# Patient Record
Sex: FEMALE | Race: WHITE | NOT HISPANIC OR LATINO | Employment: OTHER | URBAN - NONMETROPOLITAN AREA
[De-identification: names, ages, dates, MRNs, and addresses within clinical notes are randomized per-mention and may not be internally consistent; named-entity substitution may affect disease eponyms.]

---

## 2019-08-19 ENCOUNTER — HISTORICAL (OUTPATIENT)
Dept: ADMINISTRATIVE | Facility: HOSPITAL | Age: 54
End: 2019-08-19

## 2021-06-11 ENCOUNTER — HISTORICAL (OUTPATIENT)
Dept: ADMINISTRATIVE | Facility: HOSPITAL | Age: 56
End: 2021-06-11

## 2021-06-14 ENCOUNTER — HISTORICAL (OUTPATIENT)
Dept: ADMINISTRATIVE | Facility: HOSPITAL | Age: 56
End: 2021-06-14

## 2022-02-23 LAB
LEFT EYE DM RETINOPATHY: NEGATIVE
RIGHT EYE DM RETINOPATHY: NEGATIVE

## 2022-03-04 ENCOUNTER — HISTORICAL (OUTPATIENT)
Dept: ADMINISTRATIVE | Facility: HOSPITAL | Age: 57
End: 2022-03-04

## 2022-04-07 ENCOUNTER — HISTORICAL (OUTPATIENT)
Dept: ADMINISTRATIVE | Facility: HOSPITAL | Age: 57
End: 2022-04-07

## 2022-04-24 VITALS
BODY MASS INDEX: 48.82 KG/M2 | HEIGHT: 65 IN | WEIGHT: 293 LBS | SYSTOLIC BLOOD PRESSURE: 128 MMHG | OXYGEN SATURATION: 95 % | DIASTOLIC BLOOD PRESSURE: 74 MMHG

## 2022-12-07 LAB — HBA1C MFR BLD: 5.5 % (ref 4–6)

## 2023-01-09 VITALS
WEIGHT: 293 LBS | HEIGHT: 65 IN | BODY MASS INDEX: 48.82 KG/M2 | SYSTOLIC BLOOD PRESSURE: 110 MMHG | TEMPERATURE: 97 F | OXYGEN SATURATION: 95 % | DIASTOLIC BLOOD PRESSURE: 60 MMHG | HEART RATE: 72 BPM

## 2023-01-09 PROBLEM — I10 BENIGN ESSENTIAL HYPERTENSION: Status: ACTIVE | Noted: 2023-01-09

## 2023-01-09 PROBLEM — G62.9 NEUROPATHY: Status: ACTIVE | Noted: 2023-01-09

## 2023-01-09 PROBLEM — G60.0 HEREDITARY SENSORIMOTOR NEUROPATHY: Status: ACTIVE | Noted: 2023-01-09

## 2023-01-09 PROBLEM — G47.419 NARCOLEPSY WITHOUT CATAPLEXY: Status: ACTIVE | Noted: 2023-01-09

## 2023-01-09 PROBLEM — E11.9 DIABETES MELLITUS: Status: ACTIVE | Noted: 2023-01-09

## 2023-01-09 PROBLEM — I20.9 ANGINA PECTORIS: Status: ACTIVE | Noted: 2023-01-09

## 2023-01-09 PROBLEM — E78.5 HYPERLIPIDEMIA: Status: ACTIVE | Noted: 2023-01-09

## 2023-01-09 PROBLEM — I25.10 ARTERIOSCLEROSIS OF CORONARY ARTERY: Status: ACTIVE | Noted: 2023-01-09

## 2023-01-09 PROBLEM — E66.01 MORBID OBESITY: Status: ACTIVE | Noted: 2023-01-09

## 2023-01-09 PROBLEM — F32.9 MAJOR DEPRESSIVE DISORDER: Status: ACTIVE | Noted: 2023-01-09

## 2023-01-09 PROBLEM — G47.30 SLEEP APNEA: Status: ACTIVE | Noted: 2023-01-09

## 2023-01-09 PROBLEM — N18.30 STAGE 3 CHRONIC KIDNEY DISEASE: Status: ACTIVE | Noted: 2023-01-09

## 2023-01-09 PROBLEM — J44.9 CHRONIC OBSTRUCTIVE PULMONARY DISEASE: Status: ACTIVE | Noted: 2023-01-09

## 2023-01-09 PROBLEM — I50.30 HEART FAILURE WITH PRESERVED EJECTION FRACTION: Status: ACTIVE | Noted: 2023-01-09

## 2023-01-09 RX ORDER — ASPIRIN 81 MG/1
81 TABLET ORAL DAILY
COMMUNITY

## 2023-01-09 RX ORDER — MECLIZINE HYDROCHLORIDE 25 MG/1
25 TABLET ORAL 3 TIMES DAILY PRN
COMMUNITY

## 2023-01-09 RX ORDER — PANTOPRAZOLE SODIUM 40 MG/1
TABLET, DELAYED RELEASE ORAL
COMMUNITY
Start: 2022-04-27 | End: 2023-05-31 | Stop reason: SDUPTHER

## 2023-01-09 RX ORDER — ROSUVASTATIN CALCIUM 10 MG/1
TABLET, COATED ORAL
COMMUNITY
Start: 2022-02-25 | End: 2023-02-27 | Stop reason: SDUPTHER

## 2023-01-09 RX ORDER — AMITRIPTYLINE HYDROCHLORIDE 100 MG/1
TABLET ORAL
COMMUNITY
Start: 2021-12-28 | End: 2023-02-27 | Stop reason: SDUPTHER

## 2023-01-09 RX ORDER — FAMOTIDINE 20 MG/1
TABLET, FILM COATED ORAL
COMMUNITY
Start: 2021-12-28 | End: 2023-02-27 | Stop reason: SDUPTHER

## 2023-01-09 RX ORDER — CLONAZEPAM 1 MG/1
TABLET ORAL
COMMUNITY
Start: 2022-02-22 | End: 2023-03-29 | Stop reason: SDUPTHER

## 2023-01-09 RX ORDER — ALBUTEROL SULFATE 90 UG/1
2 AEROSOL, METERED RESPIRATORY (INHALATION) 4 TIMES DAILY
COMMUNITY
Start: 2022-08-29 | End: 2023-02-27 | Stop reason: SDUPTHER

## 2023-01-09 RX ORDER — GABAPENTIN 600 MG/1
TABLET ORAL
COMMUNITY
Start: 2021-12-28 | End: 2024-01-22

## 2023-01-09 RX ORDER — HYDROCODONE BITARTRATE AND ACETAMINOPHEN 10; 325 MG/1; MG/1
TABLET ORAL
COMMUNITY
Start: 2021-12-28 | End: 2023-02-03 | Stop reason: SDUPTHER

## 2023-01-09 RX ORDER — DULOXETIN HYDROCHLORIDE 60 MG/1
CAPSULE, DELAYED RELEASE ORAL
COMMUNITY
Start: 2021-12-28 | End: 2023-03-29 | Stop reason: SDUPTHER

## 2023-01-09 RX ORDER — POTASSIUM CHLORIDE 20 MEQ/1
1 TABLET, EXTENDED RELEASE ORAL EVERY MORNING
COMMUNITY
Start: 2022-09-21 | End: 2023-02-27 | Stop reason: SDUPTHER

## 2023-01-09 RX ORDER — TORSEMIDE 20 MG/1
40 TABLET ORAL 2 TIMES DAILY
COMMUNITY
Start: 2022-01-25 | End: 2023-05-31 | Stop reason: SDUPTHER

## 2023-01-09 RX ORDER — ALBUTEROL SULFATE 0.83 MG/ML
SOLUTION RESPIRATORY (INHALATION)
COMMUNITY
Start: 2022-08-22

## 2023-01-09 RX ORDER — LOSARTAN POTASSIUM 25 MG/1
TABLET ORAL
COMMUNITY
Start: 2022-07-28 | End: 2023-07-25

## 2023-01-09 RX ORDER — FLUTICASONE PROPIONATE 50 MCG
SPRAY, SUSPENSION (ML) NASAL
COMMUNITY
Start: 2022-10-06 | End: 2023-08-04

## 2023-01-09 RX ORDER — CARVEDILOL 6.25 MG/1
TABLET ORAL
COMMUNITY
Start: 2022-01-25 | End: 2023-08-31 | Stop reason: SDUPTHER

## 2023-01-09 RX ORDER — CYANOCOBALAMIN 1000 UG/ML
INJECTION, SOLUTION INTRAMUSCULAR; SUBCUTANEOUS
COMMUNITY
Start: 2022-02-22 | End: 2023-02-27 | Stop reason: SDUPTHER

## 2023-01-09 RX ORDER — ISOSORBIDE MONONITRATE 30 MG/1
1 TABLET, EXTENDED RELEASE ORAL 2 TIMES DAILY
COMMUNITY
Start: 2022-09-21 | End: 2023-02-27 | Stop reason: SDUPTHER

## 2023-01-09 RX ORDER — NITROGLYCERIN 0.4 MG/1
0.4 TABLET SUBLINGUAL EVERY 5 MIN PRN
COMMUNITY

## 2023-01-09 RX ORDER — METFORMIN HYDROCHLORIDE 1000 MG/1
TABLET ORAL
COMMUNITY
Start: 2021-12-28 | End: 2023-08-10 | Stop reason: SDUPTHER

## 2023-01-24 ENCOUNTER — TELEPHONE (OUTPATIENT)
Dept: FAMILY MEDICINE | Facility: CLINIC | Age: 58
End: 2023-01-24

## 2023-01-24 NOTE — TELEPHONE ENCOUNTER
----- Message from Kourtney Paris sent at 1/24/2023  8:51 AM CST -----  Regarding: refill  Patient is requesting a refill on     Trulicity Pen 0.75 mg/0.5 mL subcutaneous solution    [facility]

## 2023-01-26 ENCOUNTER — HOSPITAL ENCOUNTER (EMERGENCY)
Facility: HOSPITAL | Age: 58
Discharge: HOME | End: 2023-01-26
Attending: FAMILY MEDICINE
Payer: MEDICARE

## 2023-01-26 VITALS
BODY MASS INDEX: 44.41 KG/M2 | HEART RATE: 81 BPM | WEIGHT: 293 LBS | TEMPERATURE: 98 F | HEIGHT: 68 IN | OXYGEN SATURATION: 98 % | SYSTOLIC BLOOD PRESSURE: 131 MMHG | RESPIRATION RATE: 20 BRPM | DIASTOLIC BLOOD PRESSURE: 78 MMHG

## 2023-01-26 DIAGNOSIS — R05.9 COUGH: ICD-10-CM

## 2023-01-26 DIAGNOSIS — T17.908A ASPIRATION INTO AIRWAY, INITIAL ENCOUNTER: Primary | ICD-10-CM

## 2023-01-26 DIAGNOSIS — R06.02 SOB (SHORTNESS OF BREATH): ICD-10-CM

## 2023-01-26 DIAGNOSIS — T17.308A CHOKING: ICD-10-CM

## 2023-01-26 LAB
ABS NEUT CALC (OHS): 7.3 X10(3)/MCL (ref 2.1–9.2)
ALBUMIN SERPL-MCNC: 5 G/DL (ref 3.4–5)
ALBUMIN/GLOB SERPL: 1.7 RATIO
ALP SERPL-CCNC: 89 UNIT/L (ref 50–144)
ALT SERPL-CCNC: 79 UNIT/L (ref 1–45)
AST SERPL-CCNC: 53 UNIT/L (ref 14–36)
BASOPHILS NFR BLD MANUAL: 0.13 X10(3)/MCL (ref 0–0.2)
BASOPHILS NFR BLD MANUAL: 1 % (ref 0–2)
BILIRUBIN DIRECT+TOT PNL SERPL-MCNC: 0.6 MG/DL (ref 0–1)
BNP BLD-MCNC: 49.8 PG/ML (ref 10–450)
BUN SERPL-MCNC: 23 MG/DL (ref 7–20)
CALCIUM SERPL-MCNC: 11.1 MG/DL (ref 8.4–10.2)
CHLORIDE SERPL-SCNC: 97 MMOL/L (ref 98–110)
CK MB SERPL-MCNC: 2.03 NG/ML (ref 0–3.38)
CK SERPL-CCNC: 95 U/L (ref 30–135)
CO2 SERPL-SCNC: 27 MMOL/L (ref 21–32)
CREAT SERPL-MCNC: 0.8 MG/DL (ref 0.66–1.25)
EOSINOPHIL NFR BLD MANUAL: 0.13 X10(3)/MCL (ref 0–0.9)
EOSINOPHIL NFR BLD MANUAL: 1 % (ref 0–8)
ERYTHROCYTE [DISTWIDTH] IN BLOOD BY AUTOMATED COUNT: 14.4 % (ref 11–14.5)
GFR SERPLBLD CREATININE-BSD FMLA CKD-EPI: 86 MLS/MIN/1.73/M2
GLOBULIN SER-MCNC: 2.9 GM/DL (ref 2–3.9)
GLUCOSE SERPL-MCNC: 96 MG/DL (ref 70–115)
HCT VFR BLD AUTO: 47.5 % (ref 36–48)
HGB BLD-MCNC: 15.6 GM/DL (ref 11.8–16)
IMM GRANULOCYTES # BLD AUTO: 0.03 X10(3)/MCL (ref 0–0.03)
IMM GRANULOCYTES NFR BLD AUTO: 0.2 % (ref 0–0.5)
LACTATE SERPL-SCNC: 2.2 MMOL/L (ref 0.4–2)
LACTATE SERPL-SCNC: 2.7 MMOL/L (ref 0.4–2)
LYMPHOCYTES NFR BLD MANUAL: 35 % (ref 13–40)
LYMPHOCYTES NFR BLD MANUAL: 4.48 X10(3)/MCL
MCH RBC QN AUTO: 28.2 PG (ref 27–34)
MCV RBC AUTO: 85.7 FL (ref 79–99)
MEAN CELL HEMOGLOBIN CONCENTRATION (OHS) G/DL: 32.8 G/DL (ref 31–37)
MONOCYTES NFR BLD MANUAL: 0.77 X10(3)/MCL (ref 0.1–1.3)
MONOCYTES NFR BLD MANUAL: 6 % (ref 2–11)
NEUTROPHILS NFR BLD MANUAL: 56 % (ref 47–80)
NEUTS BAND NFR BLD MANUAL: 1 % (ref 0–11)
NRBC BLD AUTO-RTO: 0 % (ref 0–1)
PLATELET # BLD AUTO: 274 X10(3)/MCL (ref 140–371)
PLATELET # BLD EST: ADEQUATE 10*3/UL
PMV BLD AUTO: 9.3 FL (ref 9.4–12.4)
POTASSIUM SERPL-SCNC: 4.7 MMOL/L (ref 3.5–5.1)
PROT SERPL-MCNC: 7.9 GM/DL (ref 6.3–8.2)
RBC # BLD AUTO: 5.54 X10(6)/MCL (ref 4–5.1)
RBC MORPH BLD: NORMAL
SODIUM SERPL-SCNC: 138 MMOL/L (ref 135–145)
TROPONIN I SERPL-MCNC: <0.012 NG/ML (ref 0–0.03)
WBC # SPEC AUTO: 12.8 X10(3)/MCL (ref 4–11.5)

## 2023-01-26 PROCEDURE — 87040 BLOOD CULTURE FOR BACTERIA: CPT | Mod: 59 | Performed by: FAMILY MEDICINE

## 2023-01-26 PROCEDURE — 99284 EMERGENCY DEPT VISIT MOD MDM: CPT | Mod: 25

## 2023-01-26 PROCEDURE — 96365 THER/PROPH/DIAG IV INF INIT: CPT

## 2023-01-26 PROCEDURE — 96366 THER/PROPH/DIAG IV INF ADDON: CPT

## 2023-01-26 PROCEDURE — 96375 TX/PRO/DX INJ NEW DRUG ADDON: CPT

## 2023-01-26 PROCEDURE — 82553 CREATINE MB FRACTION: CPT | Performed by: FAMILY MEDICINE

## 2023-01-26 PROCEDURE — 85025 COMPLETE CBC W/AUTO DIFF WBC: CPT | Performed by: FAMILY MEDICINE

## 2023-01-26 PROCEDURE — 36415 COLL VENOUS BLD VENIPUNCTURE: CPT | Performed by: FAMILY MEDICINE

## 2023-01-26 PROCEDURE — 82550 ASSAY OF CK (CPK): CPT | Performed by: FAMILY MEDICINE

## 2023-01-26 PROCEDURE — 63600175 PHARM REV CODE 636 W HCPCS: Performed by: FAMILY MEDICINE

## 2023-01-26 PROCEDURE — 80053 COMPREHEN METABOLIC PANEL: CPT | Performed by: FAMILY MEDICINE

## 2023-01-26 PROCEDURE — 25000003 PHARM REV CODE 250: Performed by: FAMILY MEDICINE

## 2023-01-26 PROCEDURE — 85027 COMPLETE CBC AUTOMATED: CPT | Performed by: FAMILY MEDICINE

## 2023-01-26 PROCEDURE — 84484 ASSAY OF TROPONIN QUANT: CPT | Performed by: FAMILY MEDICINE

## 2023-01-26 PROCEDURE — 83605 ASSAY OF LACTIC ACID: CPT | Mod: 91 | Performed by: FAMILY MEDICINE

## 2023-01-26 PROCEDURE — 83880 ASSAY OF NATRIURETIC PEPTIDE: CPT | Performed by: FAMILY MEDICINE

## 2023-01-26 RX ORDER — DEXAMETHASONE SODIUM PHOSPHATE 4 MG/ML
8 INJECTION, SOLUTION INTRA-ARTICULAR; INTRALESIONAL; INTRAMUSCULAR; INTRAVENOUS; SOFT TISSUE
Status: COMPLETED | OUTPATIENT
Start: 2023-01-26 | End: 2023-01-26

## 2023-01-26 RX ORDER — LEVOFLOXACIN 500 MG/1
500 TABLET, FILM COATED ORAL DAILY
Qty: 7 TABLET | Refills: 0 | Status: SHIPPED | OUTPATIENT
Start: 2023-01-27 | End: 2023-02-03

## 2023-01-26 RX ORDER — LEVOFLOXACIN 5 MG/ML
750 INJECTION, SOLUTION INTRAVENOUS
Status: COMPLETED | OUTPATIENT
Start: 2023-01-26 | End: 2023-01-26

## 2023-01-26 RX ADMIN — LEVOFLOXACIN 750 MG: 5 INJECTION, SOLUTION INTRAVENOUS at 03:01

## 2023-01-26 RX ADMIN — SODIUM CHLORIDE 1000 ML: 9 INJECTION, SOLUTION INTRAVENOUS at 04:01

## 2023-01-26 RX ADMIN — DEXAMETHASONE SODIUM PHOSPHATE 8 MG: 4 INJECTION, SOLUTION INTRA-ARTICULAR; INTRALESIONAL; INTRAMUSCULAR; INTRAVENOUS; SOFT TISSUE at 02:01

## 2023-01-26 NOTE — ED PROVIDER NOTES
"Encounter Date: 1/26/2023       History     Chief Complaint   Patient presents with    Choking     Choked while eating vegi soup. Pt has COPD, uses home o2.      Patient was in her usual health including 24 hour dependence on oxygen for end-stage COPD, on the way out to see her primary physician she grabbed a bite of vegetable soup.  It "went down the wrong way", and the patient immediately started coughing and having trouble breathing.      Review of patient's allergies indicates:  No Known Allergies  Past Medical History:   Diagnosis Date    Angina pectoris     CMT (Charcot-Jasmyn-Tooth disease)     COPD (chronic obstructive pulmonary disease)     Coronary artery disease     Diabetes mellitus, type 2     Heart failure with preserved ejection fraction     Hypertension     Major depressive disorder     Morbid obesity     Narcolepsy without cataplexy     Neuropathy     Sleep apnea, unspecified      Past Surgical History:   Procedure Laterality Date    CHOLECYSTECTOMY      EXTRACTION OF CATARACT Right 05/23/2022    EXTRACTION OF CATARACT Left 05/04/2022    HYSTERECTOMY      TONSILLECTOMY       Family History   Problem Relation Age of Onset    Breast cancer Mother     Stroke Mother     Heart attack Father     Leukemia Father     Breast cancer Sister     Hypertension Brother      Social History     Tobacco Use    Smoking status: Every Day     Types: Vaping with nicotine    Smokeless tobacco: Never   Substance Use Topics    Drug use: Never     Review of Systems   Constitutional:  Negative for fever.   HENT:  Positive for ear pain. Negative for sore throat.    Respiratory:  Positive for cough, choking, chest tightness, shortness of breath, wheezing and stridor.    Cardiovascular:  Negative for chest pain and leg swelling.   Gastrointestinal:  Negative for nausea.   Genitourinary:  Negative for dysuria.   Musculoskeletal:  Negative for back pain.   Skin:  Negative for rash.   Neurological:  Negative for weakness. "   Hematological:  Does not bruise/bleed easily.     Physical Exam     Initial Vitals [01/26/23 1353]   BP Pulse Resp Temp SpO2   (!) 154/80 90 (!) 32 98 °F (36.7 °C) 98 %      MAP       --         Physical Exam    Constitutional:   Morbidly obese in poor health. Alert and oriented x4. Dry coughing.    HENT:   Bilateral ears with cerumen impaction.   Cardiovascular:  Normal rate and regular rhythm.           Pulmonary/Chest:   Occas bilateral rhonchi, no active wheezing   Abdominal: Abdomen is soft. There is no abdominal tenderness.   Morbidly obese     Neurological: She is alert and oriented to person, place, and time. She has normal reflexes.       ED Course   Procedures  Labs Reviewed   COMPREHENSIVE METABOLIC PANEL - Abnormal; Notable for the following components:       Result Value    Chloride 97 (*)     Blood Urea Nitrogen 23.0 (*)     Calcium Level Total 11.1 (*)     Alanine Aminotransferase 79 (*)     Aspartate Aminotransferase 53 (*)     All other components within normal limits   LACTIC ACID, PLASMA - Abnormal; Notable for the following components:    Lactic Acid Level 2.7 (*)     All other components within normal limits   CBC WITH DIFFERENTIAL - Abnormal; Notable for the following components:    WBC 12.8 (*)     RBC 5.54 (*)     MPV 9.3 (*)     All other components within normal limits   TROPONIN I - Normal   CK - Normal   CK-MB - Normal   NT-PRO NATRIURETIC PEPTIDE - Normal   BLOOD CULTURE OLG   BLOOD CULTURE OLG   CBC W/ AUTO DIFFERENTIAL    Narrative:     The following orders were created for panel order CBC Auto Differential.  Procedure                               Abnormality         Status                     ---------                               -----------         ------                     CBC with Differential[[id number]]        Abnormal            Final result               Manual Differential[[id number]]                              In process                   Please view results for these  tests on the individual orders.   MANUAL DIFFERENTIAL   LACTIC ACID, PLASMA          Imaging Results              X-Ray Chest AP Portable (Final result)  Result time 01/26/23 15:06:36      Final result by Grey Riley III, MD (01/26/23 15:06:36)                   Impression:      1. The heart is mildly to moderately enlarged with vascular congestion and slightly increased interstitial lung markings.      Electronically signed by: Grey Riley  Date:    01/26/2023  Time:    15:06               Narrative:    EXAMINATION:  STUDY: XR CHEST AP PORTABLE    CLINICAL HISTORY AND TECHNIQUE:  RT [facility] on 1/26/2023  2:41 PM    CURRENT CLINICAL HISTORY: ER PT    PT CHOKED WHILE EATING SOUP. SOB    PMH: CV-, SMOKER , COPD, CAD, DIABETES, HTN, ANGINA PECTORIS, HEART FAILURE    TECHNIQUE: PORTABLE CHEST    TECHNOLOGIST: MARJAN    COMPARISON:  06/11/2021    FINDINGS:  The heart is mildly to moderately enlarged with vascular congestion and slightly increased interstitial lung markings.I see no lobar or segmental infiltrates.No significant pleural effusions are noted.No significant musculoskeletal or vascular abnormalities are appreciated.                                       Medications   levoFLOXacin 750 mg/150 mL IVPB 750 mg (750 mg Intravenous New Bag 1/26/23 1515)   sodium chloride 0.9% bolus 1,000 mL 1,000 mL (1,000 mLs Intravenous New Bag 1/26/23 1604)   dexAMETHasone injection 8 mg (8 mg Intravenous Given 1/26/23 1458)                              Clinical Impression:   Final diagnoses:  [R06.02] SOB (shortness of breath)  [T17.308A] Choking  [R05.9] Cough  [T17.908A] Aspiration into airway, initial encounter (Primary)        ED Disposition Condition    Discharge Stable          ED Prescriptions       Medication Sig Dispense Start Date End Date Auth. Provider    levoFLOXacin (LEVAQUIN) 500 MG tablet Take 1 tablet (500 mg total) by mouth once daily. for 7 days 7 tablet 1/27/2023 2/3/2023 Everett Blanco MD          Follow-up  Information       Follow up With Specialties Details Why Contact Info    Kahlil Rodriguez MD Family Medicine Schedule an appointment as soon as possible for a visit  If symptoms worsen [address]  [facility]  [place] [address]  [phone]               Everett Blanco MD  01/26/23 3470

## 2023-01-26 NOTE — DISCHARGE INSTRUCTIONS
No obvious pneumonia seen on xray, but with probably aspiration of food into lungs, will start antibiotics. Close follow up with Dr. Rodriguez recommended. Consider return with shortness of breath, high fevers, etc.

## 2023-01-31 ENCOUNTER — PATIENT OUTREACH (OUTPATIENT)
Dept: ADMINISTRATIVE | Facility: HOSPITAL | Age: 58
End: 2023-01-31
Payer: MEDICARE

## 2023-01-31 NOTE — PROGRESS NOTES
Population Health Outreach.Records Received, hyper-linked into chart at this time. The following record(s)  below were uploaded for Health Maintenance .    12/7/2022-HEMOGLOBIN A1C

## 2023-02-01 ENCOUNTER — TELEPHONE (OUTPATIENT)
Dept: FAMILY MEDICINE | Facility: CLINIC | Age: 58
End: 2023-02-01
Payer: MEDICARE

## 2023-02-01 DIAGNOSIS — E11.9 INSULIN DEPENDENT TYPE 2 DIABETES MELLITUS: Primary | ICD-10-CM

## 2023-02-01 DIAGNOSIS — Z79.4 INSULIN DEPENDENT TYPE 2 DIABETES MELLITUS: Primary | ICD-10-CM

## 2023-02-01 LAB
BACTERIA BLD CULT: NORMAL
BACTERIA BLD CULT: NORMAL

## 2023-02-01 NOTE — TELEPHONE ENCOUNTER
Telephone call from Zainab with [facility] saying pt has 7 lb weight gain with no changes to diet. Pt hasn't had her Trulicity x 2 weeks because she missed her last appt due to was in hospital for aspiration. Has appt on the 10th scheduled.      [phone] [facility]

## 2023-02-02 RX ORDER — DULAGLUTIDE 0.75 MG/.5ML
0.75 INJECTION, SOLUTION SUBCUTANEOUS WEEKLY
Qty: 4 PEN | Refills: 0 | Status: SHIPPED | OUTPATIENT
Start: 2023-02-02 | End: 2023-02-07

## 2023-02-02 RX ORDER — DULAGLUTIDE 0.75 MG/.5ML
0.75 INJECTION, SOLUTION SUBCUTANEOUS WEEKLY
COMMUNITY
Start: 2022-12-29 | End: 2023-02-02 | Stop reason: SDUPTHER

## 2023-02-03 DIAGNOSIS — G60.0 CHARCOT-MARIE-TOOTH DISEASE: Primary | ICD-10-CM

## 2023-02-03 RX ORDER — HYDROCODONE BITARTRATE AND ACETAMINOPHEN 10; 325 MG/1; MG/1
1 TABLET ORAL EVERY 6 HOURS PRN
Qty: 120 TABLET | Refills: 0 | Status: SHIPPED | OUTPATIENT
Start: 2023-02-03 | End: 2023-03-03

## 2023-02-03 NOTE — TELEPHONE ENCOUNTER
----- Message from Zuhair Zaidi MA sent at 2/3/2023 11:15 AM CST -----  Regarding: Refill  Norco 10mg, QID PRN       [facility]       [phone]

## 2023-02-07 ENCOUNTER — OFFICE VISIT (OUTPATIENT)
Dept: FAMILY MEDICINE | Facility: CLINIC | Age: 58
End: 2023-02-07
Payer: MEDICARE

## 2023-02-07 VITALS
WEIGHT: 293 LBS | BODY MASS INDEX: 44.41 KG/M2 | DIASTOLIC BLOOD PRESSURE: 60 MMHG | HEART RATE: 70 BPM | SYSTOLIC BLOOD PRESSURE: 118 MMHG | OXYGEN SATURATION: 97 % | TEMPERATURE: 97 F | HEIGHT: 68 IN

## 2023-02-07 DIAGNOSIS — I50.32 CHRONIC HEART FAILURE WITH PRESERVED EJECTION FRACTION: ICD-10-CM

## 2023-02-07 DIAGNOSIS — G89.4 CHRONIC PAIN SYNDROME: ICD-10-CM

## 2023-02-07 DIAGNOSIS — E08.22 DIABETES MELLITUS DUE TO UNDERLYING CONDITION WITH STAGE 3 CHRONIC KIDNEY DISEASE, WITHOUT LONG-TERM CURRENT USE OF INSULIN, UNSPECIFIED WHETHER STAGE 3A OR 3B CKD: ICD-10-CM

## 2023-02-07 DIAGNOSIS — F32.9 MAJOR DEPRESSIVE DISORDER WITH CURRENT ACTIVE EPISODE, UNSPECIFIED DEPRESSION EPISODE SEVERITY, UNSPECIFIED WHETHER RECURRENT: ICD-10-CM

## 2023-02-07 DIAGNOSIS — Z79.4 INSULIN DEPENDENT TYPE 2 DIABETES MELLITUS: ICD-10-CM

## 2023-02-07 DIAGNOSIS — E78.2 MIXED HYPERLIPIDEMIA: ICD-10-CM

## 2023-02-07 DIAGNOSIS — I25.119 CORONARY ARTERY DISEASE INVOLVING NATIVE CORONARY ARTERY OF NATIVE HEART WITH ANGINA PECTORIS: ICD-10-CM

## 2023-02-07 DIAGNOSIS — J43.9 PULMONARY EMPHYSEMA, UNSPECIFIED EMPHYSEMA TYPE: ICD-10-CM

## 2023-02-07 DIAGNOSIS — G60.0 HEREDITARY SENSORIMOTOR NEUROPATHY: Primary | ICD-10-CM

## 2023-02-07 DIAGNOSIS — H60.90 OTITIS EXTERNA, UNSPECIFIED CHRONICITY, UNSPECIFIED LATERALITY, UNSPECIFIED TYPE: ICD-10-CM

## 2023-02-07 DIAGNOSIS — I10 BENIGN ESSENTIAL HYPERTENSION: ICD-10-CM

## 2023-02-07 DIAGNOSIS — F11.20 UNCOMPLICATED OPIOID DEPENDENCE: ICD-10-CM

## 2023-02-07 DIAGNOSIS — R13.10 SWALLOWING DYSFUNCTION: ICD-10-CM

## 2023-02-07 DIAGNOSIS — E11.9 INSULIN DEPENDENT TYPE 2 DIABETES MELLITUS: ICD-10-CM

## 2023-02-07 DIAGNOSIS — G47.33 OBSTRUCTIVE SLEEP APNEA SYNDROME: ICD-10-CM

## 2023-02-07 DIAGNOSIS — N18.30 DIABETES MELLITUS DUE TO UNDERLYING CONDITION WITH STAGE 3 CHRONIC KIDNEY DISEASE, WITHOUT LONG-TERM CURRENT USE OF INSULIN, UNSPECIFIED WHETHER STAGE 3A OR 3B CKD: ICD-10-CM

## 2023-02-07 DIAGNOSIS — N18.30 STAGE 3 CHRONIC KIDNEY DISEASE, UNSPECIFIED WHETHER STAGE 3A OR 3B CKD: ICD-10-CM

## 2023-02-07 PROBLEM — T17.908A ASPIRATION INTO AIRWAY: Status: RESOLVED | Noted: 2023-01-26 | Resolved: 2023-02-07

## 2023-02-07 PROBLEM — G89.29 CHRONIC PAIN: Status: ACTIVE | Noted: 2023-02-07

## 2023-02-07 PROCEDURE — 99214 PR OFFICE/OUTPT VISIT, EST, LEVL IV, 30-39 MIN: ICD-10-PCS | Mod: ,,, | Performed by: FAMILY MEDICINE

## 2023-02-07 PROCEDURE — 99214 OFFICE O/P EST MOD 30 MIN: CPT | Mod: ,,, | Performed by: FAMILY MEDICINE

## 2023-02-07 RX ORDER — TRIPROLIDINE/PSEUDOEPHEDRINE 2.5MG-60MG
TABLET ORAL
COMMUNITY
Start: 2022-11-21 | End: 2023-02-07 | Stop reason: SDUPTHER

## 2023-02-07 RX ORDER — POTASSIUM CHLORIDE 1500 MG/1
20 TABLET, EXTENDED RELEASE ORAL
COMMUNITY
Start: 2022-02-22 | End: 2023-02-27 | Stop reason: SDUPTHER

## 2023-02-07 RX ORDER — PROMETHAZINE HYDROCHLORIDE 25 MG/1
25 TABLET ORAL
COMMUNITY
Start: 2022-01-11

## 2023-02-07 RX ORDER — DULAGLUTIDE 1.5 MG/.5ML
1.5 INJECTION, SOLUTION SUBCUTANEOUS
Qty: 12 PEN | Refills: 1 | Status: SHIPPED | OUTPATIENT
Start: 2023-02-07 | End: 2023-03-29 | Stop reason: SDUPTHER

## 2023-02-07 RX ORDER — TRIPROLIDINE/PSEUDOEPHEDRINE 2.5MG-60MG
600 TABLET ORAL EVERY 6 HOURS PRN
Qty: 900 ML | Refills: 1 | Status: SHIPPED | OUTPATIENT
Start: 2023-02-07 | End: 2023-03-09

## 2023-02-07 RX ORDER — ISOSORBIDE MONONITRATE 30 MG/1
30 TABLET, EXTENDED RELEASE ORAL
COMMUNITY
Start: 2022-01-25 | End: 2023-02-27 | Stop reason: SDUPTHER

## 2023-02-07 NOTE — PROGRESS NOTES
SUBJECTIVE:  Daya Timmons is a 58 y.o. female here for Follow-up (6mth follow up labs)      HPI  She is here for follow-up on chronic medical conditions.  She complains of her usual back and joint pains.  She is having difficulty sleeping.  She is not having any current respiratory issues or heart issues  Kitas allergies, medications, history, and problem list were updated as appropriate.    Review of Systems   See history of present illness  Recent Results (from the past 504 hour(s))   Troponin I    Collection Time: 01/26/23  2:05 PM   Result Value Ref Range    Troponin-I <0.012 0.000 - 0.034 ng/mL   CK    Collection Time: 01/26/23  2:05 PM   Result Value Ref Range    Creatine Kinase 95 30 - 135 U/L   CK-MB    Collection Time: 01/26/23  2:05 PM   Result Value Ref Range    Creatine Kinase MB 2.03 0.0 - 3.38 ng/mL   Comprehensive Metabolic Panel    Collection Time: 01/26/23  2:05 PM   Result Value Ref Range    Sodium Level 138 135 - 145 mmol/L    Potassium Level 4.7 3.5 - 5.1 mmol/L    Chloride 97 (L) 98 - 110 mmol/L    Carbon Dioxide 27 21 - 32 mmol/L    Glucose Level 96 70 - 115 mg/dL    Blood Urea Nitrogen 23.0 (H) 7.0 - 20.0 mg/dL    Creatinine 0.80 0.66 - 1.25 mg/dL    Calcium Level Total 11.1 (H) 8.4 - 10.2 mg/dL    Protein Total 7.9 6.3 - 8.2 gm/dL    Albumin Level 5.0 3.4 - 5.0 g/dL    Globulin 2.9 2.0 - 3.9 gm/dL    Albumin/Globulin Ratio 1.7 ratio    Bilirubin Total 0.6 0.0 - 1.0 mg/dL    Alkaline Phosphatase 89 50 - 144 unit/L    Alanine Aminotransferase 79 (H) 1 - 45 unit/L    Aspartate Aminotransferase 53 (H) 14 - 36 unit/L    eGFR 86 mls/min/1.73/m2   NT-Pro Natriuretic Peptide    Collection Time: 01/26/23  2:05 PM   Result Value Ref Range    ProBNP 49.8 10 - 450 PG/ML   CBC with Differential    Collection Time: 01/26/23  2:05 PM   Result Value Ref Range    WBC 12.8 (H) 4.0 - 11.5 x10(3)/mcL    RBC 5.54 (H) 4.00 - 5.10 x10(6)/mcL    Hgb 15.6 11.8 - 16.0 gm/dL    Hct 47.5 36.0 - 48.0 %    MCV 85.7 79.0  "- 99.0 fL    MCH 28.2 27.0 - 34.0 pg    MCHC 32.8 31.0 - 37.0 g/dL    RDW 14.4 11.0 - 14.5 %    Platelet 274 140 - 371 x10(3)/mcL    MPV 9.3 (L) 9.4 - 12.4 fL    IG# 0.03 0.0001 - 0.031 x10(3)/mcL    IG% 0.2 0 - 0.5 %    NRBC% 0.0 0 - 1 %   Manual Differential    Collection Time: 01/26/23  2:05 PM   Result Value Ref Range    Neut Man 56 47 - 80 %    Band Neutrophil Man 1 0 - 11 %    Lymph Man 35 13 - 40 %    Monocyte Man 6 2 - 11 %    Eos Man 1 0 - 8 %    Basophil Man 1 0 - 2 %    Abs Neut calc 7.296 2.1 - 9.2 x10(3)/mcL    Abs Baso 0.128 0 - 0.2 x10(3)/mcL    Abs Mono 0.768 0.1 - 1.3 x10(3)/mcL    Abs Lymp 4.48 0.6 - 4.6 x10(3)/mcL    Abs Eos  0.128 0 - 0.9 x10(3)/mcL    RBC Morph Normal Normal    Platelet Est Adequate Normal, Adequate   Blood Culture    Collection Time: 01/26/23  2:20 PM    Specimen: Blood   Result Value Ref Range    CULTURE, BLOOD ([facility]) Final Report: At 5 days. No growth    Blood Culture    Collection Time: 01/26/23  2:26 PM    Specimen: Blood   Result Value Ref Range    CULTURE, BLOOD ([facility]) Final Report: At 5 days. No growth    Lactic Acid, Plasma    Collection Time: 01/26/23  2:26 PM   Result Value Ref Range    Lactic Acid Level 2.7 (H) 0.4 - 2.0 mmol/L   Lactic Acid, Plasma    Collection Time: 01/26/23  4:43 PM   Result Value Ref Range    Lactic Acid Level 2.2 (H) 0.4 - 2.0 mmol/L       OBJECTIVE:  Vital signs  Vitals:    02/07/23 1132   BP: 118/60   BP Location: Left arm   Pulse: 70   Temp: 97 °F (36.1 °C)   TempSrc: Temporal   SpO2: 97%   Weight: (!) 139 kg (306 lb 6.4 oz)   Height: 5' 8" (1.727 m)        Physical Exam     ASSESSMENT/PLAN:  1. Hereditary sensorimotor neuropathy  Stable.  She is on gabapentin for pain along with long standing Norco.  She is still gets around with a walker but is having increased weakness and loss of sensation in her feet.  This is following a pattern ribs under to her mother  -     CBC Auto Differential; Future; Expected date: 05/07/2023  -     Hemoglobin " A1C; Future; Expected date: 05/07/2023  -     Lipid Panel; Future; Expected date: 05/07/2023  -     Comprehensive Metabolic Panel; Future; Expected date: 05/07/2023    2. Major depressive disorder with current active episode, unspecified depression episode severity, unspecified whether recurrent  Continue duloxetine  -     CBC Auto Differential; Future; Expected date: 05/07/2023  -     Hemoglobin A1C; Future; Expected date: 05/07/2023  -     Lipid Panel; Future; Expected date: 05/07/2023  -     Comprehensive Metabolic Panel; Future; Expected date: 05/07/2023    3. Pulmonary emphysema, unspecified emphysema type  He is on albuterol nebs 4 times a day when needed.  She continues to smoke  -     CBC Auto Differential; Future; Expected date: 05/07/2023  -     Hemoglobin A1C; Future; Expected date: 05/07/2023  -     Lipid Panel; Future; Expected date: 05/07/2023  -     Comprehensive Metabolic Panel; Future; Expected date: 05/07/2023    4. Benign essential hypertension  Blood pressure well controlled on losartan, carvedilol, and long-acting nitrates   -     CBC Auto Differential; Future; Expected date: 05/07/2023  -     Hemoglobin A1C; Future; Expected date: 05/07/2023  -     Lipid Panel; Future; Expected date: 05/07/2023  -     Comprehensive Metabolic Panel; Future; Expected date: 05/07/2023    5. Coronary artery disease involving native coronary artery of native heart with angina pectoris  angina stable at this time  -     CBC Auto Differential; Future; Expected date: 05/07/2023  -     Hemoglobin A1C; Future; Expected date: 05/07/2023  -     Lipid Panel; Future; Expected date: 05/07/2023  -     Comprehensive Metabolic Panel; Future; Expected date: 05/07/2023    6. Chronic heart failure with preserved ejection fraction  On ARB and beta-blocker therapy  -     CBC Auto Differential; Future; Expected date: 05/07/2023  -     Hemoglobin A1C; Future; Expected date: 05/07/2023  -     Lipid Panel; Future; Expected date:  05/07/2023  -     Comprehensive Metabolic Panel; Future; Expected date: 05/07/2023    7. Mixed hyperlipidemia  On rosuvastatin  -     CBC Auto Differential; Future; Expected date: 05/07/2023  -     Hemoglobin A1C; Future; Expected date: 05/07/2023  -     Lipid Panel; Future; Expected date: 05/07/2023  -     Comprehensive Metabolic Panel; Future; Expected date: 05/07/2023    8. Stage 3 chronic kidney disease, unspecified whether stage 3a or 3b CKD  Recent creatinine 0.8 which is improved  -     CBC Auto Differential; Future; Expected date: 05/07/2023  -     Hemoglobin A1C; Future; Expected date: 05/07/2023  -     Lipid Panel; Future; Expected date: 05/07/2023  -     Comprehensive Metabolic Panel; Future; Expected date: 05/07/2023    9. Diabetes mellitus due to underlying condition with stage 3 chronic kidney disease, without long-term current use of insulin, unspecified whether stage 3a or 3b CKD  Recent A1c was 5.5.  She is on GLP 1 agonist along with metformin  -     CBC Auto Differential; Future; Expected date: 05/07/2023  -     Hemoglobin A1C; Future; Expected date: 05/07/2023  -     Lipid Panel; Future; Expected date: 05/07/2023  -     Comprehensive Metabolic Panel; Future; Expected date: 05/07/2023    10. Obstructive sleep apnea syndrome  Using CPAP  -     CBC Auto Differential; Future; Expected date: 05/07/2023  -     Hemoglobin A1C; Future; Expected date: 05/07/2023  -     Lipid Panel; Future; Expected date: 05/07/2023  -     Comprehensive Metabolic Panel; Future; Expected date: 05/07/2023    11. Swallowing dysfunction  Recently had a choking spell that send her to the emergency room.  It was felt she had aspirated.  She sometimes chokes on her saliva and sometimes on food.  We will get a speech therapy consult  -     Ambulatory referral/consult to Speech Therapy; Future; Expected date: 02/14/2023  -     CBC Auto Differential; Future; Expected date: 05/07/2023  -     Hemoglobin A1C; Future; Expected date:  05/07/2023  -     Lipid Panel; Future; Expected date: 05/07/2023  -     Comprehensive Metabolic Panel; Future; Expected date: 05/07/2023    12. Otitis externa, unspecified chronicity, unspecified laterality, unspecified type  She is been having some irritation itching and burning in her ears.  One time the left ear bled.  She is also been having a lot of vertigo and would like to see the ENT doctor  -     Ambulatory referral/consult to ENT; Future; Expected date: 02/14/2023  -     CBC Auto Differential; Future; Expected date: 05/07/2023  -     Hemoglobin A1C; Future; Expected date: 05/07/2023  -     Lipid Panel; Future; Expected date: 05/07/2023  -     Comprehensive Metabolic Panel; Future; Expected date: 05/07/2023      14. Chronic pain syndrome  He is on hydrocodone 40 mg daily.   report was reviewed    15. Uncomplicated opioid dependence      Other orders  -     dulaglutide (TRULICITY) 1.5 mg/0.5 mL pen injector; Inject 1.5 mg into the skin every 7 days.  Dispense: 12 pen; Refill: 1  -     ibuprofen (ADVIL,MOTRIN) 100 mg/5 mL suspension; Take 30 mLs (600 mg total) by mouth every 6 (six) hours as needed for Temperature greater than.  Dispense: 900 mL; Refill: 1         Follow Up:  Follow up in about 3 months (around 5/7/2023) for recheck, fasting labs.        Follow-up in 3 months with labs

## 2023-02-08 ENCOUNTER — PATIENT MESSAGE (OUTPATIENT)
Dept: ADMINISTRATIVE | Facility: HOSPITAL | Age: 58
End: 2023-02-08
Payer: MEDICARE

## 2023-02-08 ENCOUNTER — TELEPHONE (OUTPATIENT)
Dept: FAMILY MEDICINE | Facility: CLINIC | Age: 58
End: 2023-02-08
Payer: MEDICARE

## 2023-02-08 NOTE — TELEPHONE ENCOUNTER
----- Message from Zuhair Zaidi MA sent at 2/7/2023  2:07 PM CST -----  Regarding: Med not called out this morning  Shirlene wasn't called in to the pharmacy this morning. She would like it send to [facility].       [phone]

## 2023-02-13 ENCOUNTER — PATIENT MESSAGE (OUTPATIENT)
Dept: FAMILY MEDICINE | Facility: CLINIC | Age: 58
End: 2023-02-13

## 2023-02-23 ENCOUNTER — TELEPHONE (OUTPATIENT)
Dept: FAMILY MEDICINE | Facility: CLINIC | Age: 58
End: 2023-02-23
Payer: MEDICARE

## 2023-02-27 ENCOUNTER — TELEPHONE (OUTPATIENT)
Dept: FAMILY MEDICINE | Facility: CLINIC | Age: 58
End: 2023-02-27
Payer: MEDICARE

## 2023-02-27 DIAGNOSIS — E78.2 MIXED HYPERLIPIDEMIA: Primary | ICD-10-CM

## 2023-02-27 DIAGNOSIS — E53.8 B12 DEFICIENCY: ICD-10-CM

## 2023-02-27 DIAGNOSIS — G89.4 CHRONIC PAIN SYNDROME: ICD-10-CM

## 2023-02-27 DIAGNOSIS — J43.9 PULMONARY EMPHYSEMA, UNSPECIFIED EMPHYSEMA TYPE: ICD-10-CM

## 2023-02-27 DIAGNOSIS — K21.9 GASTROESOPHAGEAL REFLUX DISEASE, UNSPECIFIED WHETHER ESOPHAGITIS PRESENT: ICD-10-CM

## 2023-02-27 DIAGNOSIS — I20.9 ANGINA PECTORIS: ICD-10-CM

## 2023-02-27 DIAGNOSIS — I10 BENIGN ESSENTIAL HYPERTENSION: ICD-10-CM

## 2023-02-27 RX ORDER — CYANOCOBALAMIN 1000 UG/ML
1000 INJECTION, SOLUTION INTRAMUSCULAR; SUBCUTANEOUS
Qty: 1 ML | Refills: 3 | Status: SHIPPED | OUTPATIENT
Start: 2023-02-27 | End: 2023-07-03

## 2023-02-27 RX ORDER — POTASSIUM CHLORIDE 1500 MG/1
20 TABLET, EXTENDED RELEASE ORAL DAILY
Qty: 30 TABLET | Refills: 3 | Status: SHIPPED | OUTPATIENT
Start: 2023-02-27 | End: 2023-07-03

## 2023-02-27 RX ORDER — FAMOTIDINE 20 MG/1
20 TABLET, FILM COATED ORAL 2 TIMES DAILY
Qty: 60 TABLET | Refills: 5 | Status: SHIPPED | OUTPATIENT
Start: 2023-02-27 | End: 2023-08-31

## 2023-02-27 RX ORDER — AMITRIPTYLINE HYDROCHLORIDE 100 MG/1
100 TABLET ORAL NIGHTLY
Qty: 30 TABLET | Refills: 3 | Status: SHIPPED | OUTPATIENT
Start: 2023-02-27 | End: 2023-05-23 | Stop reason: SDUPTHER

## 2023-02-27 RX ORDER — ISOSORBIDE MONONITRATE 30 MG/1
30 TABLET, EXTENDED RELEASE ORAL DAILY
Qty: 30 TABLET | Refills: 3 | Status: SHIPPED | OUTPATIENT
Start: 2023-02-27 | End: 2023-07-03

## 2023-02-27 RX ORDER — ROSUVASTATIN CALCIUM 10 MG/1
10 TABLET, COATED ORAL NIGHTLY
Qty: 30 TABLET | Refills: 3 | Status: SHIPPED | OUTPATIENT
Start: 2023-02-27 | End: 2023-05-31 | Stop reason: SDUPTHER

## 2023-02-27 RX ORDER — ALBUTEROL SULFATE 90 UG/1
2 AEROSOL, METERED RESPIRATORY (INHALATION) 4 TIMES DAILY
Qty: 18 G | Refills: 3 | Status: SHIPPED | OUTPATIENT
Start: 2023-02-27 | End: 2023-07-03

## 2023-02-27 NOTE — TELEPHONE ENCOUNTER
Albuterol inh  Amitriptyline 100mg, QHS  Cyanocobalamin 1,000mcg/1mL, Qmonth  Trulicity 1.5/0.5mL, Qweekly  Duloxetine 60mg, QD  Famotidine 20mg, BID  Isosorbide 30mg, QD  Potassium 20meq, QD  Rosuvastatin 10mg, QD   Norco 10mg, Q6H        [facility]       [phone]

## 2023-03-06 DIAGNOSIS — G60.0 CHARCOT-MARIE-TOOTH DISEASE: ICD-10-CM

## 2023-03-06 RX ORDER — HYDROCODONE BITARTRATE AND ACETAMINOPHEN 10; 325 MG/1; MG/1
1 TABLET ORAL EVERY 6 HOURS PRN
Qty: 120 TABLET | Refills: 0 | Status: SHIPPED | OUTPATIENT
Start: 2023-03-06 | End: 2023-04-04 | Stop reason: SDUPTHER

## 2023-03-06 NOTE — TELEPHONE ENCOUNTER
[facility] needs a new rx for the Norco's stating that the quantity is medically necessary. Please advise.

## 2023-03-14 DIAGNOSIS — R13.11 DYSPHAGIA, ORAL PHASE: ICD-10-CM

## 2023-03-14 DIAGNOSIS — R13.10 SWALLOWING DYSFUNCTION: Primary | ICD-10-CM

## 2023-03-24 ENCOUNTER — TELEPHONE (OUTPATIENT)
Dept: FAMILY MEDICINE | Facility: CLINIC | Age: 58
End: 2023-03-24
Payer: MEDICARE

## 2023-03-24 DIAGNOSIS — B37.9 YEAST INFECTION: Primary | ICD-10-CM

## 2023-03-24 RX ORDER — FLUCONAZOLE 100 MG/1
100 TABLET ORAL DAILY
Qty: 7 TABLET | Refills: 0 | Status: SHIPPED | OUTPATIENT
Start: 2023-03-24 | End: 2023-03-31

## 2023-03-29 DIAGNOSIS — N18.30 DIABETES MELLITUS DUE TO UNDERLYING CONDITION WITH STAGE 3 CHRONIC KIDNEY DISEASE, WITHOUT LONG-TERM CURRENT USE OF INSULIN, UNSPECIFIED WHETHER STAGE 3A OR 3B CKD: Primary | ICD-10-CM

## 2023-03-29 DIAGNOSIS — G89.4 CHRONIC PAIN SYNDROME: ICD-10-CM

## 2023-03-29 DIAGNOSIS — E08.22 DIABETES MELLITUS DUE TO UNDERLYING CONDITION WITH STAGE 3 CHRONIC KIDNEY DISEASE, WITHOUT LONG-TERM CURRENT USE OF INSULIN, UNSPECIFIED WHETHER STAGE 3A OR 3B CKD: Primary | ICD-10-CM

## 2023-03-29 DIAGNOSIS — F32.9 MAJOR DEPRESSIVE DISORDER WITH CURRENT ACTIVE EPISODE, UNSPECIFIED DEPRESSION EPISODE SEVERITY, UNSPECIFIED WHETHER RECURRENT: ICD-10-CM

## 2023-03-29 RX ORDER — DULOXETIN HYDROCHLORIDE 60 MG/1
CAPSULE, DELAYED RELEASE ORAL
Qty: 30 CAPSULE | Refills: 3 | Status: SHIPPED | OUTPATIENT
Start: 2023-03-29 | End: 2024-02-14 | Stop reason: SDUPTHER

## 2023-03-29 RX ORDER — TRIPROLIDINE/PSEUDOEPHEDRINE 2.5MG-60MG
30 TABLET ORAL EVERY 6 HOURS PRN
Qty: 473 ML | Refills: 0 | Status: SHIPPED | OUTPATIENT
Start: 2023-03-29 | End: 2023-05-01 | Stop reason: SDUPTHER

## 2023-03-29 RX ORDER — DULAGLUTIDE 1.5 MG/.5ML
1.5 INJECTION, SOLUTION SUBCUTANEOUS
Qty: 12 PEN | Refills: 1 | Status: SHIPPED | OUTPATIENT
Start: 2023-03-29 | End: 2023-05-19

## 2023-03-29 RX ORDER — CLONAZEPAM 1 MG/1
TABLET ORAL
Qty: 60 TABLET | Refills: 3 | Status: SHIPPED | OUTPATIENT
Start: 2023-03-29 | End: 2023-08-01

## 2023-03-29 NOTE — TELEPHONE ENCOUNTER
Notified pt that Dr. Rodriguez doesn't want to start a muscle relaxer at present. Pt verbalized understanding

## 2023-03-29 NOTE — TELEPHONE ENCOUNTER
Pt requesting muscle relaxer for leg pain. Is this something you want to do? Says it feels like a cramp and happens everyday. Rindge too soon to refill, notified pt. Verbalized understanding. Will call  to fill date

## 2023-04-04 DIAGNOSIS — G60.0 CHARCOT-MARIE-TOOTH DISEASE: ICD-10-CM

## 2023-04-04 RX ORDER — HYDROCODONE BITARTRATE AND ACETAMINOPHEN 10; 325 MG/1; MG/1
1 TABLET ORAL EVERY 6 HOURS PRN
Qty: 120 TABLET | Refills: 0 | Status: SHIPPED | OUTPATIENT
Start: 2023-04-04 | End: 2023-05-02 | Stop reason: SDUPTHER

## 2023-04-04 NOTE — TELEPHONE ENCOUNTER
[place] 10mg, q6h      [facility]     Pharmacy will be closed Friday. She would like this called in today so that she can get it tomorrow.         [phone]

## 2023-04-24 ENCOUNTER — TELEPHONE (OUTPATIENT)
Dept: FAMILY MEDICINE | Facility: CLINIC | Age: 58
End: 2023-04-24
Payer: MEDICARE

## 2023-04-25 ENCOUNTER — TELEPHONE (OUTPATIENT)
Dept: FAMILY MEDICINE | Facility: CLINIC | Age: 58
End: 2023-04-25
Payer: MEDICARE

## 2023-04-25 NOTE — TELEPHONE ENCOUNTER
"Pt is wanting HH to draw her labs tomorrow, since they are going tomorrow to give her shot. She states that orders need to be faxed there.        [facility]      Pt states that she is having a lot of increased mood swings. States that she is sad then angry. States that her blood sugars are up and down. She states that she "feels like there is a magnet pulling her down."  "

## 2023-04-26 ENCOUNTER — LAB REQUISITION (OUTPATIENT)
Dept: LAB | Facility: HOSPITAL | Age: 58
End: 2023-04-26
Payer: MEDICARE

## 2023-04-26 DIAGNOSIS — E11.40 TYPE 2 DIABETES MELLITUS WITH DIABETIC NEUROPATHY, UNSPECIFIED: ICD-10-CM

## 2023-04-26 DIAGNOSIS — I11.0 HYPERTENSIVE HEART DISEASE WITH HEART FAILURE: ICD-10-CM

## 2023-04-26 DIAGNOSIS — E11.65 TYPE 2 DIABETES MELLITUS WITH HYPERGLYCEMIA: ICD-10-CM

## 2023-04-26 DIAGNOSIS — I50.32 CHRONIC DIASTOLIC (CONGESTIVE) HEART FAILURE: ICD-10-CM

## 2023-04-26 DIAGNOSIS — I25.119 ATHEROSCLEROTIC HEART DISEASE OF NATIVE CORONARY ARTERY WITH UNSPECIFIED ANGINA PECTORIS: ICD-10-CM

## 2023-04-26 DIAGNOSIS — E66.01 MORBID (SEVERE) OBESITY DUE TO EXCESS CALORIES: ICD-10-CM

## 2023-04-26 DIAGNOSIS — J44.9 CHRONIC OBSTRUCTIVE PULMONARY DISEASE, UNSPECIFIED: ICD-10-CM

## 2023-04-26 LAB
ALBUMIN SERPL-MCNC: 5.2 G/DL (ref 3.4–5)
ALBUMIN/GLOB SERPL: 2.1 RATIO
ALP SERPL-CCNC: 90 UNIT/L (ref 50–144)
ALT SERPL-CCNC: 106 UNIT/L (ref 1–45)
ANION GAP SERPL CALC-SCNC: 13 MEQ/L (ref 2–13)
AST SERPL-CCNC: 103 UNIT/L (ref 14–36)
BASOPHILS # BLD AUTO: 0.08 X10(3)/MCL (ref 0.01–0.08)
BASOPHILS NFR BLD AUTO: 0.9 % (ref 0.1–1.2)
BILIRUBIN DIRECT+TOT PNL SERPL-MCNC: 0.6 MG/DL (ref 0–1)
BUN SERPL-MCNC: 19 MG/DL (ref 7–20)
CALCIUM SERPL-MCNC: 10.3 MG/DL (ref 8.4–10.2)
CHLORIDE SERPL-SCNC: 96 MMOL/L (ref 98–110)
CHOLEST SERPL-MCNC: 124 MG/DL (ref 0–200)
CO2 SERPL-SCNC: 31 MMOL/L (ref 21–32)
CREAT SERPL-MCNC: 0.79 MG/DL (ref 0.66–1.25)
CREAT/UREA NIT SERPL: 24 (ref 12–20)
EOSINOPHIL # BLD AUTO: 0.29 X10(3)/MCL (ref 0.04–0.36)
EOSINOPHIL NFR BLD AUTO: 3.3 % (ref 0.7–7)
ERYTHROCYTE [DISTWIDTH] IN BLOOD BY AUTOMATED COUNT: 13.3 % (ref 11–14.5)
EST. AVERAGE GLUCOSE BLD GHB EST-MCNC: 134.1 MG/DL (ref 70–115)
GFR SERPLBLD CREATININE-BSD FMLA CKD-EPI: 87 MLS/MIN/1.73/M2
GLOBULIN SER-MCNC: 2.5 GM/DL (ref 2–3.9)
GLUCOSE SERPL-MCNC: 100 MG/DL (ref 70–115)
HBA1C MFR BLD: 6.3 % (ref 4–6)
HCT VFR BLD AUTO: 46.6 % (ref 36–48)
HDLC SERPL-MCNC: 41 MG/DL (ref 40–60)
HGB BLD-MCNC: 15.4 G/DL (ref 11.8–16)
IMM GRANULOCYTES # BLD AUTO: 0.02 X10(3)/MCL (ref 0–0.03)
IMM GRANULOCYTES NFR BLD AUTO: 0.2 % (ref 0–0.5)
LDLC SERPL DIRECT ASSAY-SCNC: 51.4 MG/DL (ref 30–100)
LYMPHOCYTES # BLD AUTO: 2.57 X10(3)/MCL (ref 1.16–3.74)
LYMPHOCYTES NFR BLD AUTO: 28.8 % (ref 20–55)
MCH RBC QN AUTO: 28.7 PG (ref 27–34)
MCV RBC AUTO: 86.8 FL (ref 79–99)
MEAN CELL HEMOGLOBIN CONCENTRATION (OHS) G/DL: 33 G/DL (ref 31–37)
MONOCYTES # BLD AUTO: 0.48 X10(3)/MCL (ref 0.24–0.36)
MONOCYTES NFR BLD AUTO: 5.4 % (ref 4.7–12.5)
NEUTROPHILS # BLD AUTO: 5.48 X10(3)/MCL (ref 1.56–6.13)
NEUTROPHILS NFR BLD AUTO: 61.4 % (ref 37–73)
NRBC BLD AUTO-RTO: 0 % (ref 0–1)
PLATELET # BLD AUTO: 246 X10(3)/MCL (ref 140–371)
PMV BLD AUTO: 9.3 FL (ref 9.4–12.4)
POTASSIUM SERPL-SCNC: 4.5 MMOL/L (ref 3.5–5.1)
PROT SERPL-MCNC: 7.7 GM/DL (ref 6.3–8.2)
RBC # BLD AUTO: 5.37 X10(6)/MCL (ref 4–5.1)
SODIUM SERPL-SCNC: 140 MMOL/L (ref 135–145)
TRIGL SERPL-MCNC: 222 MG/DL (ref 30–200)
WBC # SPEC AUTO: 8.9 X10(3)/MCL (ref 4–11.5)

## 2023-04-26 PROCEDURE — 80053 COMPREHEN METABOLIC PANEL: CPT | Performed by: FAMILY MEDICINE

## 2023-04-26 PROCEDURE — 85025 COMPLETE CBC W/AUTO DIFF WBC: CPT | Performed by: FAMILY MEDICINE

## 2023-04-26 PROCEDURE — 83036 HEMOGLOBIN GLYCOSYLATED A1C: CPT | Performed by: FAMILY MEDICINE

## 2023-04-26 PROCEDURE — 80061 LIPID PANEL: CPT | Performed by: FAMILY MEDICINE

## 2023-04-27 ENCOUNTER — TELEPHONE (OUTPATIENT)
Dept: FAMILY MEDICINE | Facility: CLINIC | Age: 58
End: 2023-04-27
Payer: MEDICARE

## 2023-04-27 DIAGNOSIS — R74.8 ELEVATED LIVER ENZYMES: Primary | ICD-10-CM

## 2023-04-27 NOTE — TELEPHONE ENCOUNTER
----- Message from Kahlil Rodriguez MD sent at 4/27/2023 12:54 PM CDT -----  Liver enzymes were a little more elevated.  Repeat CMP before her visit with me next week  ----- Message -----  From: Background User Lab  Sent: 4/26/2023  10:07 AM CDT  To: Kahlil Rodriguez MD

## 2023-04-28 ENCOUNTER — TELEPHONE (OUTPATIENT)
Dept: FAMILY MEDICINE | Facility: CLINIC | Age: 58
End: 2023-04-28
Payer: MEDICARE

## 2023-04-28 DIAGNOSIS — E08.22 DIABETES MELLITUS DUE TO UNDERLYING CONDITION WITH STAGE 3 CHRONIC KIDNEY DISEASE, WITHOUT LONG-TERM CURRENT USE OF INSULIN, UNSPECIFIED WHETHER STAGE 3A OR 3B CKD: Primary | ICD-10-CM

## 2023-04-28 DIAGNOSIS — N18.30 DIABETES MELLITUS DUE TO UNDERLYING CONDITION WITH STAGE 3 CHRONIC KIDNEY DISEASE, WITHOUT LONG-TERM CURRENT USE OF INSULIN, UNSPECIFIED WHETHER STAGE 3A OR 3B CKD: Primary | ICD-10-CM

## 2023-05-01 DIAGNOSIS — G60.0 CHARCOT-MARIE-TOOTH DISEASE: ICD-10-CM

## 2023-05-01 DIAGNOSIS — G89.4 CHRONIC PAIN SYNDROME: ICD-10-CM

## 2023-05-01 RX ORDER — TRIPROLIDINE/PSEUDOEPHEDRINE 2.5MG-60MG
30 TABLET ORAL EVERY 6 HOURS PRN
Qty: 473 ML | Refills: 0 | Status: SHIPPED | OUTPATIENT
Start: 2023-05-01 | End: 2023-05-19 | Stop reason: SDUPTHER

## 2023-05-02 RX ORDER — HYDROCODONE BITARTRATE AND ACETAMINOPHEN 10; 325 MG/1; MG/1
1 TABLET ORAL EVERY 6 HOURS PRN
Qty: 120 TABLET | Refills: 0 | Status: SHIPPED | OUTPATIENT
Start: 2023-05-02 | End: 2023-05-31 | Stop reason: SDUPTHER

## 2023-05-03 ENCOUNTER — LAB REQUISITION (OUTPATIENT)
Dept: LAB | Facility: HOSPITAL | Age: 58
End: 2023-05-03
Payer: MEDICARE

## 2023-05-03 DIAGNOSIS — I50.32 CHRONIC DIASTOLIC (CONGESTIVE) HEART FAILURE: ICD-10-CM

## 2023-05-03 DIAGNOSIS — I11.0 HYPERTENSIVE HEART DISEASE WITH HEART FAILURE: ICD-10-CM

## 2023-05-03 DIAGNOSIS — N18.30 CHRONIC KIDNEY DISEASE, STAGE 3 UNSPECIFIED: ICD-10-CM

## 2023-05-03 DIAGNOSIS — E11.40 TYPE 2 DIABETES MELLITUS WITH DIABETIC NEUROPATHY, UNSPECIFIED: ICD-10-CM

## 2023-05-03 LAB
ALBUMIN SERPL-MCNC: 4.8 G/DL (ref 3.4–5)
ALBUMIN/GLOB SERPL: 1.7 RATIO
ALP SERPL-CCNC: 83 UNIT/L (ref 50–144)
ALT SERPL-CCNC: 70 UNIT/L (ref 1–45)
ANION GAP SERPL CALC-SCNC: 12 MEQ/L (ref 2–13)
AST SERPL-CCNC: 53 UNIT/L (ref 14–36)
BILIRUBIN DIRECT+TOT PNL SERPL-MCNC: 0.4 MG/DL (ref 0–1)
BUN SERPL-MCNC: 27 MG/DL (ref 7–20)
CALCIUM SERPL-MCNC: 9.3 MG/DL (ref 8.4–10.2)
CHLORIDE SERPL-SCNC: 100 MMOL/L (ref 98–110)
CO2 SERPL-SCNC: 31 MMOL/L (ref 21–32)
CREAT SERPL-MCNC: 0.98 MG/DL (ref 0.66–1.25)
CREAT/UREA NIT SERPL: 28 (ref 12–20)
GFR SERPLBLD CREATININE-BSD FMLA CKD-EPI: 67 MLS/MIN/1.73/M2
GLOBULIN SER-MCNC: 2.8 GM/DL (ref 2–3.9)
GLUCOSE SERPL-MCNC: 108 MG/DL (ref 70–115)
POTASSIUM SERPL-SCNC: 4.3 MMOL/L (ref 3.5–5.1)
PROT SERPL-MCNC: 7.6 GM/DL (ref 6.3–8.2)
SODIUM SERPL-SCNC: 143 MMOL/L (ref 135–145)

## 2023-05-03 PROCEDURE — 80053 COMPREHEN METABOLIC PANEL: CPT | Performed by: FAMILY MEDICINE

## 2023-05-19 ENCOUNTER — OFFICE VISIT (OUTPATIENT)
Dept: FAMILY MEDICINE | Facility: CLINIC | Age: 58
End: 2023-05-19
Payer: MEDICARE

## 2023-05-19 DIAGNOSIS — R74.8 ABNORMAL LIVER ENZYMES: ICD-10-CM

## 2023-05-19 DIAGNOSIS — G60.0 HEREDITARY SENSORIMOTOR NEUROPATHY: Primary | ICD-10-CM

## 2023-05-19 DIAGNOSIS — I25.119 CORONARY ARTERY DISEASE INVOLVING NATIVE CORONARY ARTERY OF NATIVE HEART WITH ANGINA PECTORIS: ICD-10-CM

## 2023-05-19 DIAGNOSIS — N18.30 STAGE 3 CHRONIC KIDNEY DISEASE, UNSPECIFIED WHETHER STAGE 3A OR 3B CKD: ICD-10-CM

## 2023-05-19 DIAGNOSIS — E08.22 DIABETES MELLITUS DUE TO UNDERLYING CONDITION WITH STAGE 3 CHRONIC KIDNEY DISEASE, WITHOUT LONG-TERM CURRENT USE OF INSULIN, UNSPECIFIED WHETHER STAGE 3A OR 3B CKD: ICD-10-CM

## 2023-05-19 DIAGNOSIS — F32.9 MAJOR DEPRESSIVE DISORDER WITH CURRENT ACTIVE EPISODE, UNSPECIFIED DEPRESSION EPISODE SEVERITY, UNSPECIFIED WHETHER RECURRENT: ICD-10-CM

## 2023-05-19 DIAGNOSIS — J43.9 PULMONARY EMPHYSEMA, UNSPECIFIED EMPHYSEMA TYPE: ICD-10-CM

## 2023-05-19 DIAGNOSIS — G89.4 CHRONIC PAIN SYNDROME: ICD-10-CM

## 2023-05-19 DIAGNOSIS — I10 BENIGN ESSENTIAL HYPERTENSION: ICD-10-CM

## 2023-05-19 DIAGNOSIS — N18.30 DIABETES MELLITUS DUE TO UNDERLYING CONDITION WITH STAGE 3 CHRONIC KIDNEY DISEASE, WITHOUT LONG-TERM CURRENT USE OF INSULIN, UNSPECIFIED WHETHER STAGE 3A OR 3B CKD: ICD-10-CM

## 2023-05-19 DIAGNOSIS — F11.20 UNCOMPLICATED OPIOID DEPENDENCE: ICD-10-CM

## 2023-05-19 DIAGNOSIS — I50.32 CHRONIC HEART FAILURE WITH PRESERVED EJECTION FRACTION: ICD-10-CM

## 2023-05-19 PROCEDURE — 99214 PR OFFICE/OUTPT VISIT, EST, LEVL IV, 30-39 MIN: ICD-10-PCS | Mod: 95,,, | Performed by: FAMILY MEDICINE

## 2023-05-19 PROCEDURE — 99214 OFFICE O/P EST MOD 30 MIN: CPT | Mod: 95,,, | Performed by: FAMILY MEDICINE

## 2023-05-19 RX ORDER — TRIPROLIDINE/PSEUDOEPHEDRINE 2.5MG-60MG
30 TABLET ORAL EVERY 6 HOURS PRN
Qty: 900 ML | Refills: 1 | Status: SHIPPED | OUTPATIENT
Start: 2023-05-19 | End: 2023-07-31 | Stop reason: SDUPTHER

## 2023-05-19 NOTE — PROGRESS NOTES
SUBJECTIVE:  Daya Timmons is a 58 y.o. female here for No chief complaint on file.      HPI  Patient is being seen as a TeleMed visit today in follow-up on chronic medical conditions.  She has difficulty getting a ride into the office so requested a TeleMed visit.  Overall she is been doing okay.  She has multiple medical problems but nothing new as of late  This TeleMed visit took place at her home in [place]  Daya's allergies, medications, history, and problem list were updated as appropriate.    Review of Systems   No new problems to report.    Recent Results (from the past 504 hour(s))   Comprehensive Metabolic Panel    Collection Time: 05/03/23  9:00 AM   Result Value Ref Range    Sodium Level 143 135 - 145 mmol/L    Potassium Level 4.3 3.5 - 5.1 mmol/L    Chloride 100 98 - 110 mmol/L    Carbon Dioxide 31 21 - 32 mmol/L    Glucose Level 108 70 - 115 mg/dL    Blood Urea Nitrogen 27.0 (H) 7.0 - 20.0 mg/dL    Creatinine 0.98 0.66 - 1.25 mg/dL    Calcium Level Total 9.3 8.4 - 10.2 mg/dL    Protein Total 7.6 6.3 - 8.2 gm/dL    Albumin Level 4.8 3.4 - 5.0 g/dL    Globulin 2.8 2.0 - 3.9 gm/dL    Albumin/Globulin Ratio 1.7 ratio    Bilirubin Total 0.4 0.0 - 1.0 mg/dL    Alkaline Phosphatase 83 50 - 144 unit/L    Alanine Aminotransferase 70 (H) 1 - 45 unit/L    Aspartate Aminotransferase 53 (H) 14 - 36 unit/L    eGFR 67 mls/min/1.73/m2    Anion Gap 12.0 2.0 - 13.0 mEq/L    BUN/Creatinine Ratio 28 (H) 12 - 20       OBJECTIVE:  Vital signs  There were no vitals filed for this visit.     Physical Exam this is a TeleMed visit.  The patient appeared to be in good spirits and doing well in no distress    ASSESSMENT/PLAN:  1. Hereditary sensorimotor neuropathy  Stable.  She continues to have weakness in her legs and progressive worsening of symptoms.  She is on chronic opioids for the pain.  She also has a hospital bed that she requires because of her neuropathy and heart failure.  She needs to be able to elevate the head of  the bed.  Because of her morbid obesity and being over 300 lb she also needs a special mattress in his needing a new mattress at this time as the current mattress does not sufficiently support her weight and she can feel the bar in the back when she rest    2. Abnormal liver enzymes  Recent labs had shown an AST of 103 and her ALT of 106.  We repeated these a week later in the AST was down to 53 and ALT down to 70.  At the time she may have had little virus.  This may be from fatty liver disease.  She is had her gallbladder removed in the past.  She does not drink alcohol or use marijuana.      3. Chronic pain syndrome  On hydrocodone 40 was daily.   report was reviewed.  Also called out some ibuprofen today  -     ibuprofen 20 mg/mL oral liquid; Take 30 mLs (600 mg total) by mouth every 6 (six) hours as needed for Temperature greater than.  Dispense: 900 mL; Refill: 1    4. Major depressive disorder with current active episode, unspecified depression episode severity, unspecified whether recurrent  Stable on duloxetine    5. Uncomplicated opioid dependence      6. Pulmonary emphysema, unspecified emphysema type  She is on Advair and DuoNebs as needed    7. Benign essential hypertension  On losartan and carvedilol    8. Coronary artery disease involving native coronary artery of native heart with angina pectoris  No angina.  She is on statin therapy    9. Stage 3 chronic kidney disease, unspecified whether stage 3a or 3b CKD  Recent creatinine 0.98 which has improved  Overview:  Lab Results   Component Value Date    CREATININE 0.80 01/26/2023         10. Diabetes mellitus due to underlying condition with stage 3 chronic kidney disease, without long-term current use of insulin, unspecified whether stage 3a or 3b CKD  A1c is 6.3.  Currently on Trulicity and metformin.  We will increase Trulicity to 3 mg to try to get better weight loss    11. Chronic heart failure with preserved ejection fraction      Other  orders  -     dulaglutide (TRULICITY) 3 mg/0.5 mL pen injector; Inject 3 mg into the skin every 7 days.  Dispense: 12 pen; Refill: 3     This is a TeleMed visit took place between 10:30 a.m. and 11:00 a.m.    Follow Up:  Follow up in about 3 months (around 8/19/2023) for recheck, fasting labs.

## 2023-05-23 ENCOUNTER — TELEPHONE (OUTPATIENT)
Dept: FAMILY MEDICINE | Facility: CLINIC | Age: 58
End: 2023-05-23
Payer: MEDICARE

## 2023-05-23 DIAGNOSIS — G89.4 CHRONIC PAIN SYNDROME: ICD-10-CM

## 2023-05-23 RX ORDER — AMITRIPTYLINE HYDROCHLORIDE 150 MG/1
150 TABLET ORAL NIGHTLY
Qty: 30 TABLET | Refills: 5 | Status: SHIPPED | OUTPATIENT
Start: 2023-05-23 | End: 2023-10-26 | Stop reason: SDUPTHER

## 2023-05-23 NOTE — TELEPHONE ENCOUNTER
Pt is wanting to know if she can get a smaller dose of amitriptyline called out with her 100mg. Please advise.         She states that when she has left over of her 100mg, she is cutting them in half and talking the half of pill just before she goes to bed and she is sleeping all night.

## 2023-05-31 DIAGNOSIS — I50.32 CHRONIC HEART FAILURE WITH PRESERVED EJECTION FRACTION: Primary | ICD-10-CM

## 2023-05-31 DIAGNOSIS — K21.9 GASTROESOPHAGEAL REFLUX DISEASE, UNSPECIFIED WHETHER ESOPHAGITIS PRESENT: ICD-10-CM

## 2023-05-31 DIAGNOSIS — G60.0 CHARCOT-MARIE-TOOTH DISEASE: ICD-10-CM

## 2023-05-31 DIAGNOSIS — E78.2 MIXED HYPERLIPIDEMIA: ICD-10-CM

## 2023-05-31 RX ORDER — ROSUVASTATIN CALCIUM 10 MG/1
10 TABLET, COATED ORAL NIGHTLY
Qty: 30 TABLET | Refills: 5 | Status: SHIPPED | OUTPATIENT
Start: 2023-05-31 | End: 2023-10-23 | Stop reason: SDUPTHER

## 2023-05-31 RX ORDER — HYDROCODONE BITARTRATE AND ACETAMINOPHEN 10; 325 MG/1; MG/1
1 TABLET ORAL EVERY 6 HOURS PRN
Qty: 120 TABLET | Refills: 0 | Status: SHIPPED | OUTPATIENT
Start: 2023-05-31 | End: 2023-07-03 | Stop reason: SDUPTHER

## 2023-05-31 RX ORDER — TORSEMIDE 20 MG/1
40 TABLET ORAL 2 TIMES DAILY
Qty: 120 TABLET | Refills: 5 | Status: SHIPPED | OUTPATIENT
Start: 2023-05-31 | End: 2024-01-11 | Stop reason: SDUPTHER

## 2023-05-31 RX ORDER — PANTOPRAZOLE SODIUM 40 MG/1
40 TABLET, DELAYED RELEASE ORAL DAILY
Qty: 30 TABLET | Refills: 5 | Status: SHIPPED | OUTPATIENT
Start: 2023-05-31 | End: 2024-01-11 | Stop reason: SDUPTHER

## 2023-06-05 ENCOUNTER — TELEPHONE (OUTPATIENT)
Dept: FAMILY MEDICINE | Facility: CLINIC | Age: 58
End: 2023-06-05
Payer: MEDICARE

## 2023-06-05 DIAGNOSIS — B37.9 YEAST INFECTION: Primary | ICD-10-CM

## 2023-06-05 RX ORDER — FLUCONAZOLE 100 MG/1
100 TABLET ORAL DAILY
Qty: 5 TABLET | Refills: 0 | Status: SHIPPED | OUTPATIENT
Start: 2023-06-05 | End: 2023-06-10

## 2023-06-27 DIAGNOSIS — G60.0 CHARCOT-MARIE-TOOTH DISEASE: ICD-10-CM

## 2023-06-27 NOTE — TELEPHONE ENCOUNTER
Norco 10mg, q6h        Pt states that she knows it not due until Friday, but she didn't want to forget to call it in.         [facility]

## 2023-07-03 DIAGNOSIS — J43.9 PULMONARY EMPHYSEMA, UNSPECIFIED EMPHYSEMA TYPE: ICD-10-CM

## 2023-07-03 DIAGNOSIS — I10 BENIGN ESSENTIAL HYPERTENSION: ICD-10-CM

## 2023-07-03 DIAGNOSIS — E53.8 B12 DEFICIENCY: ICD-10-CM

## 2023-07-03 DIAGNOSIS — I20.9 ANGINA PECTORIS: ICD-10-CM

## 2023-07-03 RX ORDER — ISOSORBIDE MONONITRATE 30 MG/1
TABLET, EXTENDED RELEASE ORAL
Qty: 30 TABLET | Refills: 3 | Status: SHIPPED | OUTPATIENT
Start: 2023-07-03 | End: 2023-12-27 | Stop reason: SDUPTHER

## 2023-07-03 RX ORDER — POTASSIUM CHLORIDE 20 MEQ/1
TABLET, EXTENDED RELEASE ORAL
Qty: 30 TABLET | Refills: 3 | Status: SHIPPED | OUTPATIENT
Start: 2023-07-03 | End: 2024-01-11 | Stop reason: SDUPTHER

## 2023-07-03 RX ORDER — CYANOCOBALAMIN 1000 UG/ML
INJECTION, SOLUTION INTRAMUSCULAR; SUBCUTANEOUS
Qty: 1 ML | Refills: 3 | Status: SHIPPED | OUTPATIENT
Start: 2023-07-03 | End: 2023-09-27

## 2023-07-03 RX ORDER — ALBUTEROL SULFATE 90 UG/1
AEROSOL, METERED RESPIRATORY (INHALATION)
Qty: 6.7 G | Refills: 3 | Status: SHIPPED | OUTPATIENT
Start: 2023-07-03 | End: 2023-12-28 | Stop reason: SDUPTHER

## 2023-07-03 RX ORDER — HYDROCODONE BITARTRATE AND ACETAMINOPHEN 10; 325 MG/1; MG/1
1 TABLET ORAL EVERY 6 HOURS PRN
Qty: 120 TABLET | Refills: 0 | Status: SHIPPED | OUTPATIENT
Start: 2023-07-03 | End: 2023-08-01

## 2023-07-03 NOTE — TELEPHONE ENCOUNTER
Pt is calling back for this medicine. States that she didn't hear anything back and medicine wasn't called in. Please advise.

## 2023-07-17 ENCOUNTER — TELEPHONE (OUTPATIENT)
Dept: FAMILY MEDICINE | Facility: CLINIC | Age: 58
End: 2023-07-17
Payer: MEDICARE

## 2023-07-17 NOTE — TELEPHONE ENCOUNTER
Pt states that her Trulicity isn't working. States that Saturday her BS was over 300. States that her BP is elevated & so is the fluid. She is asking for a telemed appt with Dr. Marino. Please advise.

## 2023-07-25 ENCOUNTER — OFFICE VISIT (OUTPATIENT)
Dept: FAMILY MEDICINE | Facility: CLINIC | Age: 58
End: 2023-07-25
Payer: MEDICARE

## 2023-07-25 DIAGNOSIS — E08.22 DIABETES MELLITUS DUE TO UNDERLYING CONDITION WITH STAGE 3 CHRONIC KIDNEY DISEASE, WITHOUT LONG-TERM CURRENT USE OF INSULIN, UNSPECIFIED WHETHER STAGE 3A OR 3B CKD: ICD-10-CM

## 2023-07-25 DIAGNOSIS — I10 BENIGN ESSENTIAL HYPERTENSION: ICD-10-CM

## 2023-07-25 DIAGNOSIS — I50.32 CHRONIC HEART FAILURE WITH PRESERVED EJECTION FRACTION: ICD-10-CM

## 2023-07-25 DIAGNOSIS — N18.30 STAGE 3 CHRONIC KIDNEY DISEASE, UNSPECIFIED WHETHER STAGE 3A OR 3B CKD: ICD-10-CM

## 2023-07-25 DIAGNOSIS — G62.9 NEUROPATHY: Primary | ICD-10-CM

## 2023-07-25 DIAGNOSIS — N18.30 DIABETES MELLITUS DUE TO UNDERLYING CONDITION WITH STAGE 3 CHRONIC KIDNEY DISEASE, WITHOUT LONG-TERM CURRENT USE OF INSULIN, UNSPECIFIED WHETHER STAGE 3A OR 3B CKD: ICD-10-CM

## 2023-07-25 DIAGNOSIS — I25.119 CORONARY ARTERY DISEASE INVOLVING NATIVE CORONARY ARTERY OF NATIVE HEART WITH ANGINA PECTORIS: ICD-10-CM

## 2023-07-25 PROCEDURE — 99214 PR OFFICE/OUTPT VISIT, EST, LEVL IV, 30-39 MIN: ICD-10-PCS | Mod: 95,,, | Performed by: FAMILY MEDICINE

## 2023-07-25 PROCEDURE — 99214 OFFICE O/P EST MOD 30 MIN: CPT | Mod: 95,,, | Performed by: FAMILY MEDICINE

## 2023-07-25 RX ORDER — LOSARTAN POTASSIUM 100 MG/1
100 TABLET ORAL DAILY
Qty: 90 TABLET | Refills: 3 | Status: SHIPPED | OUTPATIENT
Start: 2023-07-25 | End: 2024-07-24

## 2023-07-25 RX ORDER — DAPAGLIFLOZIN 10 MG/1
10 TABLET, FILM COATED ORAL DAILY
Qty: 90 TABLET | Refills: 0 | Status: SHIPPED | OUTPATIENT
Start: 2023-07-25 | End: 2023-10-23

## 2023-07-25 NOTE — PROGRESS NOTES
SUBJECTIVE:  Daya Timmons is a 58 y.o. female here for No chief complaint on file.      HPI  Patient is being seen as a TeleMed visit today from her home in [place].  She is difficulty getting to the office.  She requested this visit because her blood sugars has been running high at times over 200.  Her blood pressure has also been running high.  Neuropathy in her legs is getting worse in her left foot is starting to turn in just like her mom's did as she got older.  She is been having weight fluctuations of 10-15 lb at times.  Daya's allergies, medications, history, and problem list were updated as appropriate.    Review of Systems   See [facility]    No results found for this or any previous visit (from the past 504 hour(s)).    OBJECTIVE:  Vital signs  There were no vitals filed for this visit.     Physical Exam this is a TeleMed visit.  She appears to be doing well today    ASSESSMENT/PLAN:  1. Neuropathy  She has Charcot-Jasmyn-Tooth disease.  This is stable.  She is on chronic hydrocodone therapy for her pain.   report was reviewed  -     CBC Auto Differential; Future; Expected date: 07/25/2023  -     Comprehensive Metabolic Panel; Future; Expected date: 07/25/2023  -     Hemoglobin A1C; Future; Expected date: 07/25/2023  -     Lipid Panel; Future; Expected date: 07/25/2023    2. Chronic heart failure with preserved ejection fraction  I would like to add an SGLT 2 inhibitor as this will help with her blood sugars and blood pressure as well  -     CBC Auto Differential; Future; Expected date: 07/25/2023  -     Comprehensive Metabolic Panel; Future; Expected date: 07/25/2023  -     Hemoglobin A1C; Future; Expected date: 07/25/2023  -     Lipid Panel; Future; Expected date: 07/25/2023    3. Coronary artery disease involving native coronary artery of native heart with angina pectoris  No current angina, she is on long-acting nitro  -     CBC Auto Differential; Future; Expected date: 07/25/2023  -     Comprehensive  Metabolic Panel; Future; Expected date: 07/25/2023  -     Hemoglobin A1C; Future; Expected date: 07/25/2023  -     Lipid Panel; Future; Expected date: 07/25/2023    4. Benign essential hypertension  Increase losartan to 100 mg daily  -     CBC Auto Differential; Future; Expected date: 07/25/2023  -     Comprehensive Metabolic Panel; Future; Expected date: 07/25/2023  -     Hemoglobin A1C; Future; Expected date: 07/25/2023  -     Lipid Panel; Future; Expected date: 07/25/2023    5. Stage 3 chronic kidney disease, unspecified whether stage 3a or 3b CKD  Continue Arb as well as adding Farxiga.  Her most recent GFR was 67        Orders:  -     CBC Auto Differential; Future; Expected date: 07/25/2023  -     Comprehensive Metabolic Panel; Future; Expected date: 07/25/2023  -     Hemoglobin A1C; Future; Expected date: 07/25/2023  -     Lipid Panel; Future; Expected date: 07/25/2023    6. Diabetes mellitus due to underlying condition with stage 3 chronic kidney disease, without long-term current use of insulin, unspecified whether stage 3a or 3b CKD  Change Trulicity to Ozempic as she felt this worked better.  Continue metformin.  Add Farxiga  -     CBC Auto Differential; Future; Expected date: 07/25/2023  -     Comprehensive Metabolic Panel; Future; Expected date: 07/25/2023  -     Hemoglobin A1C; Future; Expected date: 07/25/2023  -     Lipid Panel; Future; Expected date: 07/25/2023    Other orders  -     semaglutide (OZEMPIC) 1 mg/dose (2 mg/1.5 mL) PnIj; Inject 1 mg into the skin every 7 days.  Dispense: 9 mL; Refill: 3  -     losartan (COZAAR) 100 MG tablet; Take 1 tablet (100 mg total) by mouth once daily.  Dispense: 90 tablet; Refill: 3  -     dapagliflozin propanediol (FARXIGA) 10 mg tablet; Take 1 tablet (10 mg total) by mouth once daily.  Dispense: 90 tablet; Refill: 0       This is a TeleMed visit.  It took place between 12:00 p.m. and 12:15 p.m.  Follow Up:  No follow-ups on file.  She has a follow-up next  month

## 2023-07-27 ENCOUNTER — LAB REQUISITION (OUTPATIENT)
Dept: LAB | Facility: HOSPITAL | Age: 58
End: 2023-07-27
Payer: MEDICARE

## 2023-07-27 DIAGNOSIS — E78.5 HYPERLIPIDEMIA, UNSPECIFIED: ICD-10-CM

## 2023-07-27 DIAGNOSIS — D64.9 ANEMIA, UNSPECIFIED: ICD-10-CM

## 2023-07-27 DIAGNOSIS — E11.9 TYPE 2 DIABETES MELLITUS WITHOUT COMPLICATIONS: ICD-10-CM

## 2023-07-27 DIAGNOSIS — I10 ESSENTIAL (PRIMARY) HYPERTENSION: ICD-10-CM

## 2023-07-27 LAB
ALBUMIN SERPL-MCNC: 5 G/DL (ref 3.4–5)
ALBUMIN/GLOB SERPL: 2 RATIO
ALP SERPL-CCNC: 94 UNIT/L (ref 50–144)
ALT SERPL-CCNC: 120 UNIT/L (ref 1–45)
ANION GAP SERPL CALC-SCNC: 13 MEQ/L (ref 2–13)
AST SERPL-CCNC: 110 UNIT/L (ref 14–36)
BILIRUBIN DIRECT+TOT PNL SERPL-MCNC: 0.6 MG/DL (ref 0–1)
BUN SERPL-MCNC: 15 MG/DL (ref 7–20)
CALCIUM SERPL-MCNC: 9.6 MG/DL (ref 8.4–10.2)
CHLORIDE SERPL-SCNC: 99 MMOL/L (ref 98–110)
CHOLEST SERPL-MCNC: 113 MG/DL (ref 0–200)
CO2 SERPL-SCNC: 29 MMOL/L (ref 21–32)
CREAT SERPL-MCNC: 0.74 MG/DL (ref 0.66–1.25)
CREAT/UREA NIT SERPL: 20 (ref 12–20)
ERYTHROCYTE [DISTWIDTH] IN BLOOD BY AUTOMATED COUNT: 14.3 % (ref 11–14.5)
EST. AVERAGE GLUCOSE BLD GHB EST-MCNC: 128.4 MG/DL (ref 70–115)
GFR SERPLBLD CREATININE-BSD FMLA CKD-EPI: >90 MLS/MIN/1.73/M2
GLOBULIN SER-MCNC: 2.5 GM/DL (ref 2–3.9)
GLUCOSE SERPL-MCNC: 112 MG/DL (ref 70–115)
HBA1C MFR BLD: 6.1 % (ref 4–6)
HCT VFR BLD AUTO: 46.3 % (ref 36–48)
HDLC SERPL-MCNC: 40 MG/DL (ref 40–60)
HGB BLD-MCNC: 15.1 G/DL (ref 11.8–16)
LDLC SERPL DIRECT ASSAY-SCNC: 53.3 MG/DL (ref 30–100)
MCH RBC QN AUTO: 28 PG (ref 27–34)
MCHC RBC AUTO-ENTMCNC: 32.6 G/DL (ref 31–37)
MCV RBC AUTO: 85.7 FL (ref 79–99)
NRBC BLD AUTO-RTO: 0 %
PLATELET # BLD AUTO: 219 X10(3)/MCL (ref 140–371)
PMV BLD AUTO: 9.7 FL (ref 9.4–12.4)
POTASSIUM SERPL-SCNC: 4.6 MMOL/L (ref 3.5–5.1)
PROT SERPL-MCNC: 7.5 GM/DL (ref 6.3–8.2)
RBC # BLD AUTO: 5.4 X10(6)/MCL (ref 4–5.1)
SODIUM SERPL-SCNC: 141 MMOL/L (ref 135–145)
TRIGL SERPL-MCNC: 162 MG/DL (ref 30–200)
WBC # SPEC AUTO: 7.71 X10(3)/MCL (ref 4–11.5)

## 2023-07-27 PROCEDURE — 80061 LIPID PANEL: CPT | Performed by: FAMILY MEDICINE

## 2023-07-27 PROCEDURE — 80053 COMPREHEN METABOLIC PANEL: CPT | Performed by: FAMILY MEDICINE

## 2023-07-27 PROCEDURE — 85027 COMPLETE CBC AUTOMATED: CPT | Performed by: FAMILY MEDICINE

## 2023-07-27 PROCEDURE — 83036 HEMOGLOBIN GLYCOSYLATED A1C: CPT | Performed by: FAMILY MEDICINE

## 2023-07-31 ENCOUNTER — TELEPHONE (OUTPATIENT)
Dept: FAMILY MEDICINE | Facility: CLINIC | Age: 58
End: 2023-07-31
Payer: MEDICARE

## 2023-07-31 DIAGNOSIS — G89.4 CHRONIC PAIN SYNDROME: ICD-10-CM

## 2023-07-31 RX ORDER — TRIPROLIDINE/PSEUDOEPHEDRINE 2.5MG-60MG
30 TABLET ORAL EVERY 6 HOURS PRN
Qty: 900 ML | Refills: 1 | Status: SHIPPED | OUTPATIENT
Start: 2023-07-31 | End: 2023-09-27

## 2023-07-31 NOTE — TELEPHONE ENCOUNTER
She has an upcoming appt with Dr. Rodriguez that they can discuss the Dexcom about.. depending on her insurance.

## 2023-08-01 ENCOUNTER — TELEPHONE (OUTPATIENT)
Dept: FAMILY MEDICINE | Facility: CLINIC | Age: 58
End: 2023-08-01

## 2023-08-01 DIAGNOSIS — F32.9 MAJOR DEPRESSIVE DISORDER WITH CURRENT ACTIVE EPISODE, UNSPECIFIED DEPRESSION EPISODE SEVERITY, UNSPECIFIED WHETHER RECURRENT: ICD-10-CM

## 2023-08-01 DIAGNOSIS — G60.0 CHARCOT-MARIE-TOOTH DISEASE: ICD-10-CM

## 2023-08-01 RX ORDER — HYDROCODONE BITARTRATE AND ACETAMINOPHEN 10; 325 MG/1; MG/1
TABLET ORAL
Qty: 120 TABLET | Refills: 0 | Status: SHIPPED | OUTPATIENT
Start: 2023-08-01 | End: 2023-08-31 | Stop reason: SDUPTHER

## 2023-08-01 RX ORDER — CLONAZEPAM 1 MG/1
TABLET ORAL
Qty: 60 TABLET | Refills: 3 | Status: SHIPPED | OUTPATIENT
Start: 2023-08-01 | End: 2023-11-27

## 2023-08-02 ENCOUNTER — HOSPITAL ENCOUNTER (OUTPATIENT)
Facility: HOSPITAL | Age: 58
Discharge: LEFT BEFORE DISCHARGE | End: 2023-08-02
Attending: FAMILY MEDICINE | Admitting: FAMILY MEDICINE
Payer: MEDICARE

## 2023-08-02 VITALS
BODY MASS INDEX: 44.41 KG/M2 | HEART RATE: 83 BPM | TEMPERATURE: 98 F | DIASTOLIC BLOOD PRESSURE: 78 MMHG | WEIGHT: 293 LBS | RESPIRATION RATE: 16 BRPM | SYSTOLIC BLOOD PRESSURE: 144 MMHG | OXYGEN SATURATION: 95 % | HEIGHT: 68 IN

## 2023-08-02 DIAGNOSIS — R05.9 COUGH IN ADULT: ICD-10-CM

## 2023-08-02 DIAGNOSIS — R40.4 TRANSIENT ALTERATION OF AWARENESS: ICD-10-CM

## 2023-08-02 DIAGNOSIS — R41.82 ALTERED MENTAL STATUS, UNSPECIFIED ALTERED MENTAL STATUS TYPE: Primary | ICD-10-CM

## 2023-08-02 PROBLEM — R74.01 TRANSAMINITIS: Status: ACTIVE | Noted: 2023-05-19

## 2023-08-02 PROBLEM — I50.32 CHRONIC DIASTOLIC CHF (CONGESTIVE HEART FAILURE): Status: ACTIVE | Noted: 2023-01-09

## 2023-08-02 PROBLEM — G93.40 ACUTE ENCEPHALOPATHY: Status: ACTIVE | Noted: 2023-08-02

## 2023-08-02 LAB
ALBUMIN SERPL-MCNC: 5.2 G/DL (ref 3.4–5)
ALBUMIN/GLOB SERPL: 1.9 RATIO
ALP SERPL-CCNC: 110 UNIT/L (ref 50–144)
ALT SERPL-CCNC: 99 UNIT/L (ref 1–45)
AMMONIA PLAS-MSCNC: 22 UMOL/L (ref 11–32)
AMPHET UR QL SCN: NEGATIVE
ANION GAP SERPL CALC-SCNC: 14 MEQ/L (ref 2–13)
APPEARANCE UR: CLEAR
APTT PPP: 25.6 SECONDS (ref 23–29.4)
AST SERPL-CCNC: 91 UNIT/L (ref 14–36)
BARBITURATE SCN PRESENT UR: NEGATIVE
BASOPHILS # BLD AUTO: 0.09 X10(3)/MCL (ref 0.01–0.08)
BASOPHILS NFR BLD AUTO: 1.1 % (ref 0.1–1.2)
BENZODIAZ UR QL SCN: NEGATIVE
BILIRUB UR QL STRIP.AUTO: NEGATIVE
BILIRUBIN DIRECT+TOT PNL SERPL-MCNC: 0.6 MG/DL (ref 0–1)
BUN SERPL-MCNC: 23 MG/DL (ref 7–20)
CALCIUM SERPL-MCNC: 9.8 MG/DL (ref 8.4–10.2)
CANNABINOIDS UR QL SCN: NEGATIVE
CHLORIDE SERPL-SCNC: 96 MMOL/L (ref 98–110)
CO2 SERPL-SCNC: 26 MMOL/L (ref 21–32)
COCAINE UR QL SCN: NEGATIVE
COLOR UR: YELLOW
CREAT SERPL-MCNC: 0.77 MG/DL (ref 0.66–1.25)
CREAT/UREA NIT SERPL: 30 (ref 12–20)
EOSINOPHIL # BLD AUTO: 0.26 X10(3)/MCL (ref 0.04–0.36)
EOSINOPHIL NFR BLD AUTO: 3.1 % (ref 0.7–7)
ERYTHROCYTE [DISTWIDTH] IN BLOOD BY AUTOMATED COUNT: 14.2 % (ref 11–14.5)
GFR SERPLBLD CREATININE-BSD FMLA CKD-EPI: 90 MLS/MIN/1.73/M2
GLOBULIN SER-MCNC: 2.8 GM/DL (ref 2–3.9)
GLUCOSE SERPL-MCNC: 84 MG/DL (ref 70–115)
GLUCOSE UR QL STRIP.AUTO: 100
HCT VFR BLD AUTO: 44.9 % (ref 36–48)
HGB BLD-MCNC: 14.7 G/DL (ref 11.8–16)
IMM GRANULOCYTES # BLD AUTO: 0.02 X10(3)/MCL (ref 0–0.03)
IMM GRANULOCYTES NFR BLD AUTO: 0.2 % (ref 0–0.5)
INR PPP: 1
KETONES UR QL STRIP.AUTO: NEGATIVE
LEUKOCYTE ESTERASE UR QL STRIP.AUTO: NEGATIVE
LYMPHOCYTES # BLD AUTO: 2.88 X10(3)/MCL (ref 1.16–3.74)
LYMPHOCYTES NFR BLD AUTO: 33.8 % (ref 20–55)
MAGNESIUM SERPL-MCNC: 2.6 MG/DL (ref 1.8–2.4)
MCH RBC QN AUTO: 27.6 PG (ref 27–34)
MCHC RBC AUTO-ENTMCNC: 32.7 G/DL (ref 31–37)
MCV RBC AUTO: 84.2 FL (ref 79–99)
METHADONE UR QL SCN: NEGATIVE
MONOCYTES # BLD AUTO: 0.5 X10(3)/MCL (ref 0.24–0.36)
MONOCYTES NFR BLD AUTO: 5.9 % (ref 4.7–12.5)
NEUTROPHILS # BLD AUTO: 4.76 X10(3)/MCL (ref 1.56–6.13)
NEUTROPHILS NFR BLD AUTO: 55.9 % (ref 37–73)
NITRITE UR QL STRIP.AUTO: NEGATIVE
NRBC BLD AUTO-RTO: 0 %
OPIATES UR QL SCN: POSITIVE
PCP UR QL: NEGATIVE
PH UR STRIP.AUTO: 6 [PH]
PH UR: 6 [PH] (ref 3–11)
PLATELET # BLD AUTO: 221 X10(3)/MCL (ref 140–371)
PMV BLD AUTO: 9.3 FL (ref 9.4–12.4)
POCT GLUCOSE: 91 MG/DL (ref 70–110)
POTASSIUM SERPL-SCNC: 4 MMOL/L (ref 3.5–5.1)
PROT SERPL-MCNC: 8 GM/DL (ref 6.3–8.2)
PROT UR QL STRIP.AUTO: NEGATIVE
PROTHROMBIN TIME: 10.2 SECONDS (ref 9.3–11.9)
RBC # BLD AUTO: 5.33 X10(6)/MCL (ref 4–5.1)
RBC UR QL AUTO: NEGATIVE
SODIUM SERPL-SCNC: 136 MMOL/L (ref 135–145)
SP GR UR STRIP.AUTO: <=1.005
UROBILINOGEN UR STRIP-ACNC: 0.2
WBC # SPEC AUTO: 8.51 X10(3)/MCL (ref 4–11.5)

## 2023-08-02 PROCEDURE — 36415 COLL VENOUS BLD VENIPUNCTURE: CPT | Mod: 59 | Performed by: FAMILY MEDICINE

## 2023-08-02 PROCEDURE — 63600175 PHARM REV CODE 636 W HCPCS

## 2023-08-02 PROCEDURE — 81003 URINALYSIS AUTO W/O SCOPE: CPT | Performed by: FAMILY MEDICINE

## 2023-08-02 PROCEDURE — 85025 COMPLETE CBC W/AUTO DIFF WBC: CPT | Performed by: FAMILY MEDICINE

## 2023-08-02 PROCEDURE — 80307 DRUG TEST PRSMV CHEM ANLYZR: CPT | Performed by: FAMILY MEDICINE

## 2023-08-02 PROCEDURE — 80053 COMPREHEN METABOLIC PANEL: CPT | Performed by: FAMILY MEDICINE

## 2023-08-02 PROCEDURE — G0425 PR INPT TELEHEALTH CONSULT 30M: ICD-10-PCS | Mod: 95,,, | Performed by: PSYCHIATRY & NEUROLOGY

## 2023-08-02 PROCEDURE — 83735 ASSAY OF MAGNESIUM: CPT | Performed by: FAMILY MEDICINE

## 2023-08-02 PROCEDURE — 99291 CRITICAL CARE FIRST HOUR: CPT | Mod: 25

## 2023-08-02 PROCEDURE — 51702 INSERT TEMP BLADDER CATH: CPT

## 2023-08-02 PROCEDURE — G0425 INPT/ED TELECONSULT30: HCPCS | Mod: 95,,, | Performed by: PSYCHIATRY & NEUROLOGY

## 2023-08-02 PROCEDURE — 82140 ASSAY OF AMMONIA: CPT | Performed by: FAMILY MEDICINE

## 2023-08-02 PROCEDURE — 96374 THER/PROPH/DIAG INJ IV PUSH: CPT | Mod: 59

## 2023-08-02 PROCEDURE — 85730 THROMBOPLASTIN TIME PARTIAL: CPT | Performed by: FAMILY MEDICINE

## 2023-08-02 PROCEDURE — 25500020 PHARM REV CODE 255: Performed by: FAMILY MEDICINE

## 2023-08-02 PROCEDURE — G0378 HOSPITAL OBSERVATION PER HR: HCPCS

## 2023-08-02 PROCEDURE — 85610 PROTHROMBIN TIME: CPT | Performed by: FAMILY MEDICINE

## 2023-08-02 RX ORDER — ENOXAPARIN SODIUM 100 MG/ML
40 INJECTION SUBCUTANEOUS EVERY 24 HOURS
Status: CANCELLED | OUTPATIENT
Start: 2023-08-02

## 2023-08-02 RX ORDER — TORSEMIDE 20 MG/1
40 TABLET ORAL 2 TIMES DAILY
Status: CANCELLED | OUTPATIENT
Start: 2023-08-02

## 2023-08-02 RX ORDER — ALBUTEROL SULFATE 90 UG/1
2 AEROSOL, METERED RESPIRATORY (INHALATION) 4 TIMES DAILY
Status: CANCELLED | OUTPATIENT
Start: 2023-08-02

## 2023-08-02 RX ORDER — NALOXONE HYDROCHLORIDE 1 MG/ML
INJECTION INTRAMUSCULAR; INTRAVENOUS; SUBCUTANEOUS
Status: COMPLETED
Start: 2023-08-02 | End: 2023-08-02

## 2023-08-02 RX ORDER — NALOXONE HCL 0.4 MG/ML
2 VIAL (ML) INJECTION
Status: DISCONTINUED | OUTPATIENT
Start: 2023-08-02 | End: 2023-08-02

## 2023-08-02 RX ORDER — IBUPROFEN 200 MG
16 TABLET ORAL
Status: CANCELLED | OUTPATIENT
Start: 2023-08-02

## 2023-08-02 RX ORDER — CARVEDILOL 6.25 MG/1
6.25 TABLET ORAL 2 TIMES DAILY
Status: CANCELLED | OUTPATIENT
Start: 2023-08-02

## 2023-08-02 RX ORDER — DULOXETIN HYDROCHLORIDE 30 MG/1
60 CAPSULE, DELAYED RELEASE ORAL DAILY
Status: CANCELLED | OUTPATIENT
Start: 2023-08-03

## 2023-08-02 RX ORDER — POTASSIUM CHLORIDE 20 MEQ/1
20 TABLET, EXTENDED RELEASE ORAL DAILY
Status: CANCELLED | OUTPATIENT
Start: 2023-08-03

## 2023-08-02 RX ORDER — LOSARTAN POTASSIUM 50 MG/1
100 TABLET ORAL DAILY
Status: CANCELLED | OUTPATIENT
Start: 2023-08-03

## 2023-08-02 RX ORDER — NALOXONE HYDROCHLORIDE 1 MG/ML
2 INJECTION INTRAMUSCULAR; INTRAVENOUS; SUBCUTANEOUS ONCE
Status: COMPLETED | OUTPATIENT
Start: 2023-08-02 | End: 2023-08-02

## 2023-08-02 RX ORDER — TALC
6 POWDER (GRAM) TOPICAL NIGHTLY PRN
Status: CANCELLED | OUTPATIENT
Start: 2023-08-02

## 2023-08-02 RX ORDER — FAMOTIDINE 20 MG/1
20 TABLET, FILM COATED ORAL 2 TIMES DAILY
Status: CANCELLED | OUTPATIENT
Start: 2023-08-02

## 2023-08-02 RX ORDER — SODIUM CHLORIDE 0.9 % (FLUSH) 0.9 %
3 SYRINGE (ML) INJECTION EVERY 6 HOURS PRN
Status: CANCELLED | OUTPATIENT
Start: 2023-08-02

## 2023-08-02 RX ORDER — GLUCAGON 1 MG
1 KIT INJECTION
Status: CANCELLED | OUTPATIENT
Start: 2023-08-02

## 2023-08-02 RX ORDER — KETOROLAC TROMETHAMINE 30 MG/ML
15 INJECTION, SOLUTION INTRAMUSCULAR; INTRAVENOUS
Status: DISCONTINUED | OUTPATIENT
Start: 2023-08-02 | End: 2023-08-02 | Stop reason: HOSPADM

## 2023-08-02 RX ORDER — ONDANSETRON 2 MG/ML
4 INJECTION INTRAMUSCULAR; INTRAVENOUS EVERY 6 HOURS PRN
Status: CANCELLED | OUTPATIENT
Start: 2023-08-02

## 2023-08-02 RX ORDER — NITROGLYCERIN 0.4 MG/1
0.4 TABLET SUBLINGUAL EVERY 5 MIN PRN
Status: CANCELLED | OUTPATIENT
Start: 2023-08-02

## 2023-08-02 RX ORDER — ASPIRIN 81 MG/1
81 TABLET ORAL DAILY
Status: CANCELLED | OUTPATIENT
Start: 2023-08-03

## 2023-08-02 RX ORDER — IBUPROFEN 200 MG
24 TABLET ORAL
Status: CANCELLED | OUTPATIENT
Start: 2023-08-02

## 2023-08-02 RX ORDER — IBUPROFEN 400 MG/1
400 TABLET ORAL EVERY 6 HOURS PRN
Status: CANCELLED | OUTPATIENT
Start: 2023-08-02

## 2023-08-02 RX ADMIN — NALOXONE HYDROCHLORIDE 2 MG: 1 INJECTION PARENTERAL at 01:08

## 2023-08-02 RX ADMIN — IOPAMIDOL 100 ML: 755 INJECTION, SOLUTION INTRAVENOUS at 02:08

## 2023-08-02 RX ADMIN — NALOXONE HYDROCHLORIDE 2 MG: 1 INJECTION INTRAMUSCULAR; INTRAVENOUS; SUBCUTANEOUS at 01:08

## 2023-08-02 NOTE — CONSULTS
[facility]  Telehospitalist Consult Note    Patient Name: Daya Timmons  MRN: [id number]  Admission Date: 8/2/2023  Hospital Length of Stay: 0 days  Attending Physician: Frankie Bennett MD   Primary Care Provider: Kahlli Marino MD           Patient information was obtained from patient and ER records.     Consults  Subjective:     Principal Problem: Acute encephalopathy    Chief Complaint: Altered mental status.    HPI: Ms. Timmons is a 58 year old  female with a history of CAD, type II diabetes mellitus, HTN, SHARAD, and depression who presented to the ER today with altered mental status. She was in her normal state of health until this morning around 11 am when she developed a right sided headache. She didn't eat anything this morning when she took her medications and she believes that her blood sugar may have dropped. She states that she took her norco and klonopin last night. She became acutely lethargic and confused while talking to her family and her family called 911. On arrival to the ER she was hypertensive with a blood pressure of 163/88 and her initial labwork was remarkable for a AST of 91 and ALT of 99. Her urine drug screen was positive for opiates and her urinalysis was unremarkable. Her CXR showed a moderately enlarged heart with vascular congestion and mildly increased interstitial lung markings and CT of the head revealed no acute intracranial abnormality. CTA of the head and neck demonstrated no gross abnormalities and she was evaluated by teleneurology who did not recommend tPA. She has received narcan 2 mg IV and ketorolac 15 mg IV in the ER and her mentation has markedly improved. She is otherwise in stable condition and she has no other complaints today.      Past Medical History:   Diagnosis Date    Anxiety     Cataract     Charcot-Jasmyn-Tooth disease     Chronic diastolic CHF (congestive heart failure)     COPD (chronic obstructive  pulmonary disease)     Coronary artery disease     Depression     GERD (gastroesophageal reflux disease)     Hypertension     Morbid obesity     Narcolepsy     Obstructive sleep apnea     Peripheral neuropathy     Type II diabetes mellitus        Past Surgical History:   Procedure Laterality Date    CHOLECYSTECTOMY      EXTRACTION OF CATARACT Right 05/23/2022    EXTRACTION OF CATARACT Left 05/04/2022    HYSTERECTOMY      TONSILLECTOMY         Review of patient's allergies indicates:  No Known Allergies    No current facility-administered medications on file prior to encounter.     Current Outpatient Medications on File Prior to Encounter   Medication Sig    albuterol (PROVENTIL/VENTOLIN HFA) 90 mcg/actuation inhaler INHALE 2 PUFFS 4 TIMES DAILY -- SHAKE WELL BEFORE USE    amitriptyline (ELAVIL) 150 MG Tab Take 1 tablet (150 mg total) by mouth every evening.    blood sugar diagnostic Strp 1 strip by Misc.(Non-Drug; Combo Route) route 2 (two) times a day.    carvediloL (COREG) 6.25 MG tablet   6.25 mg = 1 tab(s), Oral, BID, # 180 tab(s), 3 Refill(s), Pharmacy: [facility], 166, cm, Height/Length Dosing, 06/21/22 9:02:00 CDT, 149, kg, Weight Dosing, 06/21/22 9:02:00 CDT    clonazePAM (KLONOPIN) 1 MG tablet TAKE 1 TABLET TWICE DAILY FOR ANXIETY --MAY CAUSE DROWSINESS--    dapagliflozin propanediol (FARXIGA) 10 mg tablet Take 1 tablet (10 mg total) by mouth once daily.    DULoxetine (CYMBALTA) 60 MG capsule 60 mg = 1 cap(s), Oral, Daily, (do not crush or chew), # 30 cap(s), 3 Refill(s), Pharmacy: [facility], 166, cm, Height/Length Dosing, 06/21/22 9:02:00 CDT, 149, kg, Weight Dosing, 06/21/22 9:02:00 CDT    famotidine (PEPCID) 20 MG tablet Take 1 tablet (20 mg total) by mouth 2 (two) times daily.    gabapentin (NEURONTIN) 600 MG tablet   600 mg = 1 tab(s), Oral, TID, # 270 tab(s), 3 Refill(s), Pharmacy: [facility], 166, cm, Height/Length Dosing, 06/21/22 9:02:00 CDT, 149, kg,  Weight Dosing, 06/21/22 9:02:00 CDT    HYDROcodone-acetaminophen (NORCO)  mg per tablet TAKE 1 TABLET EVERY 6 HOURS AS NEEDED FOR PAIN --MAY CAUSE DROWSINESS--    isosorbide mononitrate (IMDUR) 30 MG 24 hr tablet TAKE 1 TABLET DAILY FOR HEART    LANCETS MISC by Misc.(Non-Drug; Combo Route) route 4 (four) times daily.    losartan (COZAAR) 100 MG tablet Take 1 tablet (100 mg total) by mouth once daily.    metFORMIN (GLUCOPHAGE) 1000 MG tablet   1,000 mg = 1 tab(s), Oral, BID, # 180 tab(s), 3 Refill(s), Pharmacy: [facility], 166, cm, Height/Length Dosing, 06/21/22 9:02:00 CDT, 149, kg, Weight Dosing, 06/21/22 9:02:00 CDT    pantoprazole (PROTONIX) 40 MG tablet Take 1 tablet (40 mg total) by mouth once daily.    potassium chloride SA (K-DUR,KLOR-CON) 20 MEQ tablet TAKE 1 TABLET DAILY FOR POTASSIUM    rosuvastatin (CRESTOR) 10 MG tablet Take 1 tablet (10 mg total) by mouth every evening.    torsemide (DEMADEX) 20 MG Tab Take 2 tablets (40 mg total) by mouth 2 (two) times a day.    albuterol (PROVENTIL) 2.5 mg /3 mL (0.083 %) nebulizer solution   2.5 mg = 3 mL, NEB, q4hr, # 540 mL, 6 Refill(s), Pharmacy: [facility], 166, cm, Height/Length Dosing, 06/21/22 9:02:00 CDT, 149, kg, Weight Dosing, 06/21/22 9:02:00 CDT    aspirin (ECOTRIN) 81 MG EC tablet Take 81 mg by mouth once daily.    cyanocobalamin 1,000 mcg/mL injection INJECT ONCE WEEKLY    fluticasone propionate (FLONASE) 50 mcg/actuation nasal spray   2 spray(s), Nasal, Daily, # 16 gm, 6 Refill(s), Pharmacy: [facility], 166, cm, Height/Length Dosing, 06/21/22 9:02:00 CDT, 149, kg, Weight Dosing, 06/21/22 9:02:00 CDT    ibuprofen 20 mg/mL oral liquid Take 30 mLs (600 mg total) by mouth every 6 (six) hours as needed for Temperature greater than.    meclizine (ANTIVERT) 25 mg tablet Take 25 mg by mouth 3 (three) times daily as needed for Dizziness.    nitroGLYCERIN (NITROSTAT) 0.4 MG SL tablet Place 0.4 mg under the tongue  every 5 (five) minutes as needed for Chest pain.    promethazine (PHENERGAN) 25 MG tablet Take 25 mg by mouth.    semaglutide (OZEMPIC) 1 mg/dose (2 mg/1.5 mL) PnIj Inject 1 mg into the skin every 7 days.     Family History       Problem Relation (Age of Onset)    Breast cancer Mother, Sister    Heart attack Father    Hypertension Brother    Leukemia Father    Stroke Mother        Social History:   Tobacco Use    Smoking status: Every Day     Current packs/day: 0.00     Types: Vaping with nicotine    Smokeless tobacco: Never   Substance and Sexual Activity    Alcohol use: Not on file    Drug use: Never    Sexual activity: Not Currently     Review of Systems   Unable to perform ROS: Mental status change   Constitutional: Negative.    HENT: Negative.     Eyes: Negative.    Respiratory: Negative.     Cardiovascular: Negative.    Gastrointestinal: Negative.    Endocrine: Negative.    Genitourinary: Negative.    Musculoskeletal: Negative.    Skin: Negative.    Allergic/Immunologic: Negative.    Neurological:  Positive for headaches.   Hematological: Negative.    Psychiatric/Behavioral:  Positive for confusion.      Objective:     Vital Signs (Most Recent):  Pulse: 84 (08/02/23 1431)  Resp: 20 (08/02/23 1431)  BP: 134/71 (08/02/23 1431)  SpO2: 97 % (08/02/23 1431) Vital Signs (24h Range):  Pulse:  [80-85] 84  Resp:  [12-20] 20  SpO2:  [96 %-98 %] 97 %  BP: (128-163)/(71-92) 134/71     Weight: (!) 146.5 kg (323 lb)  Body mass index is 49.11 kg/m².     Physical Exam  General - Morbidly obese  female in no acute distress.  HEENT - NCAT. No scleral icterus. Oropharynx clear. Mucous membranes moist.  Neck - No lymphadenopathy, thyromegaly, or JVD.  CVS - Regular rate and rhythm. No murmurs, rubs, or gallops.  Resp - Lungs are clear to auscultation bilaterally. No rales, wheeze, or rhonchi.   GI - Soft, nontender, nondistended, normoactive bowel sounds present.   Extremities - No clubbing, cyanosis, or edema.    Neuro - CN II through XII are grossly intact. No focal neurological deficits. Alert and oriented times four.   Psych - Normal affect.  Skin - No rash, skin tear, or ulcer.            Significant Labs: All pertinent labs within the past 24 hours have been reviewed.  CBC:   Recent Labs   Lab 08/02/23  1222   WBC 8.51   HGB 14.7   HCT 44.9        CMP:   Recent Labs   Lab 08/02/23  1222      K 4.0   CO2 26   BUN 23.0*   CREATININE 0.77   CALCIUM 9.8   ALBUMIN 5.2*   BILITOT 0.6   ALKPHOS 110   AST 91*   ALT 99*     Magnesium:   Recent Labs   Lab 08/02/23  1222   MG 2.60*     Urine Studies:   Recent Labs   Lab 08/02/23  1250   APPEARANCEUA Clear   PROTEINUA Negative   BILIRUBINUA Negative   UROBILINOGEN 0.2   LEUKOCYTESUR Negative       Significant Imaging: I have reviewed all pertinent imaging results/findings within the past 24 hours.  Imaging Results              CTA Brain (Final result)  Result time 08/02/23 14:33:24      Final result by rGey Riley III, MD (08/02/23 14:33:24)                   Impression:      1. No gross abnormalities are noted on less than optimal imaging.  See above comments for details as well as limitations of this examination.      Electronically signed by: Grey Riley  Date:    08/02/2023  Time:    14:33               Narrative:    EXAMINATION:  STUDY: CTA HEAD    CLINICAL HISTORY AND TECHNIQUE:  Kya Thomas, RT on 8/2/2023  2:23 PM    PT STATUS: ER    PROCEDURE: CTA BRAIN    CLINICAL HX : RUE WEAKNESS, AMS 5 MIN. PTA    PMH: HTN, DM    IV CONTRAST: 100CC ISOVUE 370    ORAL CONTRAST: NONE    RECTAL CONTRAST: NONE    AXIAL IMAGES @ 5MM INTERVALS WITH MULTIPLANAR & 3D RECONSTRUCTION    TOTAL IMAGE NUMBER: 112    NUMBER OF CT SCANS IN PAST 12 MONTHS: 2    CTDIvol(mGy): HEAD: 358.8   BODY:    DLP(mGycm): HEAD: 961.5    BODY:    TECH: AKG/NN    PT TRANSPORTED W/O INCIDENT    This patient has had 2 CT and nuclear medicine scans performed within the last 12 months.    The following  DOSE REDUCTION TECHNIQUES are used for all CT scans at [facility]:    1. Automated exposure control.  2. Adjustment of the mA and/or kv according to patient size.  3. Use of iterative reconstruction technique.    COMPARISON:  CT scan of the head/brain performed earlier the same day    FINDINGS:  CTA of the [place] was performed.  It should be noted that MRA is typically superior to CTA of the [place] due to subtraction artifact related to the process of subtraction of the skeletal structures of the skull base from the vascular structures which often obscures fine detail on CTA.  The quality of this examination is less than optimal due to fairly pronounced subtraction artifact.  The [place] and its primary branches and tributaries are best delineated on the raw data images and appear grossly unremarkable with no obvious hemodynamically significant stenosis, aneurysmal dilatation or other significant abnormalities.                                       X-Ray Chest AP Portable (Final result)  Result time 08/02/23 12:42:41      Final result by Grey Riley III, MD (08/02/23 12:42:41)                   Impression:      1. The heart is moderately enlarged with vascular congestion and mildly increased interstitial lung markings.      Electronically signed by: Grey Riley  Date:    08/02/2023  Time:    12:42               Narrative:    EXAMINATION:  STUDY: XR CHEST AP PORTABLE    CLINICAL HISTORY AND TECHNIQUE:  RT Victor M on 8/2/2023 12:21 PM    CURRENT CLINICAL HISTORY: ER PT    X PTA    -AMS    PAST MEDICAL HISTORY: SMOKER, CAD, HTN, COPD    TECHNIQUE: 1 VIEW CHEST PORTABLE    TECHNOLOGIST: LORETA    COMPARISON:  01/26/2023    FINDINGS:  The heart is moderately enlarged with vascular congestion and mildly increased interstitial lung markings, all of which are exaggerated by the patient's size and poor inspiratory effort.I see no lobar or segmental infiltrates.No  significant pleural effusions are noted.Mild degenerative changes are noted involving the thoracic spine.                                       CT Head Without Contrast (Final result)  Result time 08/02/23 12:06:52      Final result by Grey Riley III, MD (08/02/23 12:06:52)                   Impression:      1. No significant intracranial abnormalities are noted.  The ER physician was notified of these results at 12:05 on 08/02/2023 via telephone call.    COMMUNICATION  The ER physician was notified of these results at 12:05 on 08/02/2023 via telephone call.      Electronically signed by: Grey Riley  Date:    08/02/2023  Time:    12:06               Narrative:    EXAMINATION:  STUDY: CT HEAD WITHOUT CONTRAST    CLINICAL HISTORY AND TECHNIQUE:  Kya Thomas RT on 8/2/2023 12:01 PM    PT STATUS: ER    PROCEDURE: CT BRAIN WO    CLINICAL HX : RUE WEAKNESS, AMS 5 MIN. PTA    PMH: HTN, DM    IV CONTRAST: NONE    ORAL CONTRAST: NONE    RECTAL CONTRAST: NONE    AXIAL IMAGES @ 5MM INTERVALS WITH MULTIPLANAR RECONSTRUCTION    TOTAL IMAGE NUMBER: 32    NUMBER OF CT SCANS IN PAST 12 MONTHS: 1    CTDIvol(mGy): HEAD: 52.7    BODY:    DLP(mGycm): HEAD: 1210.0    BODY:    TECH: AKG/NN    PT TRANSPORTED W/O INCIDENT    This patient has had 1 CT and nuclear medicine scans performed within the last 12 months.    The following DOSE REDUCTION TECHNIQUES are used for all CT scans at [facility]:    1. Automated exposure control.  2. Adjustment of the mA and/or kv according to patient size.  3. Use of iterative reconstruction technique.    COMPARISON:  None    FINDINGS:  Axial imaging through the head/brain was performed.  I see no intraparenchymal masses, hemorrhagic lesions, or dominant wedge-shaped infarcts. I see no extra-axial masses or abnormal fluid collections.                                        Assessment/Plan:     * Acute encephalopathy  Her altered mental status has resolved and was likely due to  oversedation from norco vs. hypoglycemia. Admission to the hospital was offered to the patient but declined. She refuses MRI of the brain due to claustrophobia and she states that she will make a follow-up appointment her PCP within the next week. This was discussed with the ER physician Dr. Mumtaz Pacheco who is in agreement.    Transaminitis  Likely due to fatty liver disease.    VTE Risk Mitigation (From admission, onward)    None          This service was provided via telemedicine.  Type of Software: Audio/Visual.  Originating Site: [facility] ER.  Distant Site: [facility].  Her exam was performed with the assistance of her ER nurse.      Thank you for your consult. I will sign off. Please contact us if you have any additional questions.    Abhilash Chamorro MD  Department of Hospital Medicine   [facility]

## 2023-08-02 NOTE — Clinical Note
Diagnosis: Altered mental status, unspecified altered mental status type [[id number]]   Future Attending Provider: JESUS BUENO [[id number]]   Admitting Provider:: JESUS BUENO [[id number]]

## 2023-08-02 NOTE — ED NOTES
Pt seen and evaluated by Dr. Pacheco. Pt has no complaints at this time and verbalized understanding of education. Pt wheeled out of ER and assisted into PV by nurse. .  No acute distress noted. See provider notes.     Pt left AMA

## 2023-08-02 NOTE — SUBJECTIVE & OBJECTIVE
Past Medical History:   Diagnosis Date    Anxiety     Cataract     Charcot-Jasmyn-Tooth disease     Chronic diastolic CHF (congestive heart failure)     COPD (chronic obstructive pulmonary disease)     Coronary artery disease     Depression     GERD (gastroesophageal reflux disease)     Hypertension     Morbid obesity     Narcolepsy     Obstructive sleep apnea     Peripheral neuropathy     Type II diabetes mellitus        Past Surgical History:   Procedure Laterality Date    CHOLECYSTECTOMY      EXTRACTION OF CATARACT Right 05/23/2022    EXTRACTION OF CATARACT Left 05/04/2022    HYSTERECTOMY      TONSILLECTOMY         Review of patient's allergies indicates:  No Known Allergies    No current facility-administered medications on file prior to encounter.     Current Outpatient Medications on File Prior to Encounter   Medication Sig    albuterol (PROVENTIL/VENTOLIN HFA) 90 mcg/actuation inhaler INHALE 2 PUFFS 4 TIMES DAILY -- SHAKE WELL BEFORE USE    amitriptyline (ELAVIL) 150 MG Tab Take 1 tablet (150 mg total) by mouth every evening.    blood sugar diagnostic Strp 1 strip by Misc.(Non-Drug; Combo Route) route 2 (two) times a day.    carvediloL (COREG) 6.25 MG tablet   6.25 mg = 1 tab(s), Oral, BID, # 180 tab(s), 3 Refill(s), Pharmacy: [facility], 166, cm, Height/Length Dosing, 06/21/22 9:02:00 CDT, 149, kg, Weight Dosing, 06/21/22 9:02:00 CDT    clonazePAM (KLONOPIN) 1 MG tablet TAKE 1 TABLET TWICE DAILY FOR ANXIETY --MAY CAUSE DROWSINESS--    dapagliflozin propanediol (FARXIGA) 10 mg tablet Take 1 tablet (10 mg total) by mouth once daily.    DULoxetine (CYMBALTA) 60 MG capsule 60 mg = 1 cap(s), Oral, Daily, (do not crush or chew), # 30 cap(s), 3 Refill(s), Pharmacy: [facility], 166, cm, Height/Length Dosing, 06/21/22 9:02:00 CDT, 149, kg, Weight Dosing, 06/21/22 9:02:00 CDT    famotidine (PEPCID) 20 MG tablet Take 1 tablet (20 mg total) by mouth 2 (two) times daily.    gabapentin (NEURONTIN) 600 MG  tablet   600 mg = 1 tab(s), Oral, TID, # 270 tab(s), 3 Refill(s), Pharmacy: [facility], 166, cm, Height/Length Dosing, 06/21/22 9:02:00 CDT, 149, kg, Weight Dosing, 06/21/22 9:02:00 CDT    HYDROcodone-acetaminophen (NORCO)  mg per tablet TAKE 1 TABLET EVERY 6 HOURS AS NEEDED FOR PAIN --MAY CAUSE DROWSINESS--    isosorbide mononitrate (IMDUR) 30 MG 24 hr tablet TAKE 1 TABLET DAILY FOR HEART    LANCETS MISC by Misc.(Non-Drug; Combo Route) route 4 (four) times daily.    losartan (COZAAR) 100 MG tablet Take 1 tablet (100 mg total) by mouth once daily.    metFORMIN (GLUCOPHAGE) 1000 MG tablet   1,000 mg = 1 tab(s), Oral, BID, # 180 tab(s), 3 Refill(s), Pharmacy: [facility], 166, cm, Height/Length Dosing, 06/21/22 9:02:00 CDT, 149, kg, Weight Dosing, 06/21/22 9:02:00 CDT    pantoprazole (PROTONIX) 40 MG tablet Take 1 tablet (40 mg total) by mouth once daily.    potassium chloride SA (K-DUR,KLOR-CON) 20 MEQ tablet TAKE 1 TABLET DAILY FOR POTASSIUM    rosuvastatin (CRESTOR) 10 MG tablet Take 1 tablet (10 mg total) by mouth every evening.    torsemide (DEMADEX) 20 MG Tab Take 2 tablets (40 mg total) by mouth 2 (two) times a day.    albuterol (PROVENTIL) 2.5 mg /3 mL (0.083 %) nebulizer solution   2.5 mg = 3 mL, NEB, q4hr, # 540 mL, 6 Refill(s), Pharmacy: [facility], 166, cm, Height/Length Dosing, 06/21/22 9:02:00 CDT, 149, kg, Weight Dosing, 06/21/22 9:02:00 CDT    aspirin (ECOTRIN) 81 MG EC tablet Take 81 mg by mouth once daily.    cyanocobalamin 1,000 mcg/mL injection INJECT ONCE WEEKLY    fluticasone propionate (FLONASE) 50 mcg/actuation nasal spray   2 spray(s), Nasal, Daily, # 16 gm, 6 Refill(s), Pharmacy: [facility], 166, cm, Height/Length Dosing, 06/21/22 9:02:00 CDT, 149, kg, Weight Dosing, 06/21/22 9:02:00 CDT    ibuprofen 20 mg/mL oral liquid Take 30 mLs (600 mg total) by mouth every 6 (six) hours as needed for Temperature greater than.    meclizine (ANTIVERT) 25 mg tablet Take  25 mg by mouth 3 (three) times daily as needed for Dizziness.    nitroGLYCERIN (NITROSTAT) 0.4 MG SL tablet Place 0.4 mg under the tongue every 5 (five) minutes as needed for Chest pain.    promethazine (PHENERGAN) 25 MG tablet Take 25 mg by mouth.    semaglutide (OZEMPIC) 1 mg/dose (2 mg/1.5 mL) PnIj Inject 1 mg into the skin every 7 days.     Family History       Problem Relation (Age of Onset)    Breast cancer Mother, Sister    Heart attack Father    Hypertension Brother    Leukemia Father    Stroke Mother        Social History:   Tobacco Use    Smoking status: Every Day     Current packs/day: 0.00     Types: Vaping with nicotine    Smokeless tobacco: Never   Substance and Sexual Activity    Alcohol use: Not on file    Drug use: Never    Sexual activity: Not Currently     Review of Systems   Unable to perform ROS: Mental status change   Constitutional: Negative.    HENT: Negative.     Eyes: Negative.    Respiratory: Negative.     Cardiovascular: Negative.    Gastrointestinal: Negative.    Endocrine: Negative.    Genitourinary: Negative.    Musculoskeletal: Negative.    Skin: Negative.    Allergic/Immunologic: Negative.    Neurological:  Positive for headaches.   Hematological: Negative.    Psychiatric/Behavioral:  Positive for confusion.      Objective:     Vital Signs (Most Recent):  Pulse: 84 (08/02/23 1431)  Resp: 20 (08/02/23 1431)  BP: 134/71 (08/02/23 1431)  SpO2: 97 % (08/02/23 1431) Vital Signs (24h Range):  Pulse:  [80-85] 84  Resp:  [12-20] 20  SpO2:  [96 %-98 %] 97 %  BP: (128-163)/(71-92) 134/71     Weight: (!) 146.5 kg (323 lb)  Body mass index is 49.11 kg/m².     Physical Exam  General - Morbidly obese  female in no acute distress.  HEENT - NCAT. No scleral icterus. Oropharynx clear. Mucous membranes moist.  Neck - No lymphadenopathy, thyromegaly, or JVD.  CVS - Regular rate and rhythm. No murmurs, rubs, or gallops.  Resp - Lungs are clear to auscultation bilaterally. No rales, wheeze, or  rhonchi.   GI - Soft, nontender, nondistended, normoactive bowel sounds present.   Extremities - No clubbing, cyanosis, or edema.   Neuro - CN II through XII are grossly intact. No focal neurological deficits. Alert and oriented times four.   Psych - Normal affect.  Skin - No rash, skin tear, or ulcer.            Significant Labs: All pertinent labs within the past 24 hours have been reviewed.  CBC:   Recent Labs   Lab 08/02/23  1222   WBC 8.51   HGB 14.7   HCT 44.9        CMP:   Recent Labs   Lab 08/02/23  1222      K 4.0   CO2 26   BUN 23.0*   CREATININE 0.77   CALCIUM 9.8   ALBUMIN 5.2*   BILITOT 0.6   ALKPHOS 110   AST 91*   ALT 99*     Magnesium:   Recent Labs   Lab 08/02/23  1222   MG 2.60*     Urine Studies:   Recent Labs   Lab 08/02/23  1250   APPEARANCEUA Clear   PROTEINUA Negative   BILIRUBINUA Negative   UROBILINOGEN 0.2   LEUKOCYTESUR Negative       Significant Imaging: I have reviewed all pertinent imaging results/findings within the past 24 hours.  Imaging Results              CTA Brain (Final result)  Result time 08/02/23 14:33:24      Final result by Grey Riley III, MD (08/02/23 14:33:24)                   Impression:      1. No gross abnormalities are noted on less than optimal imaging.  See above comments for details as well as limitations of this examination.      Electronically signed by: Grey Riley  Date:    08/02/2023  Time:    14:33               Narrative:    EXAMINATION:  STUDY: CTA HEAD    CLINICAL HISTORY AND TECHNIQUE:  Kya Thomas RT on 8/2/2023  2:23 PM    PT STATUS: ER    PROCEDURE: CTA BRAIN    CLINICAL HX : RUE WEAKNESS, AMS 5 MIN. PTA    PMH: HTN, DM    IV CONTRAST: 100CC ISOVUE 370    ORAL CONTRAST: NONE    RECTAL CONTRAST: NONE    AXIAL IMAGES @ 5MM INTERVALS WITH MULTIPLANAR & 3D RECONSTRUCTION    TOTAL IMAGE NUMBER: 112    NUMBER OF CT SCANS IN PAST 12 MONTHS: 2    CTDIvol(mGy): HEAD: 358.8   BODY:    DLP(mGycm): HEAD: 961.5    BODY:    TECH: AKG/NN    PT  TRANSPORTED W/O INCIDENT    This patient has had 2 CT and nuclear medicine scans performed within the last 12 months.    The following DOSE REDUCTION TECHNIQUES are used for all CT scans at [facility]:    1. Automated exposure control.  2. Adjustment of the mA and/or kv according to patient size.  3. Use of iterative reconstruction technique.    COMPARISON:  CT scan of the head/brain performed earlier the same day    FINDINGS:  CTA of the [place] was performed.  It should be noted that MRA is typically superior to CTA of the [place] due to subtraction artifact related to the process of subtraction of the skeletal structures of the skull base from the vascular structures which often obscures fine detail on CTA.  The quality of this examination is less than optimal due to fairly pronounced subtraction artifact.  The [place] and its primary branches and tributaries are best delineated on the raw data images and appear grossly unremarkable with no obvious hemodynamically significant stenosis, aneurysmal dilatation or other significant abnormalities.                                       X-Ray Chest AP Portable (Final result)  Result time 08/02/23 12:42:41      Final result by Grey Riley III, MD (08/02/23 12:42:41)                   Impression:      1. The heart is moderately enlarged with vascular congestion and mildly increased interstitial lung markings.      Electronically signed by: Grey Riley  Date:    08/02/2023  Time:    12:42               Narrative:    EXAMINATION:  STUDY: XR CHEST AP PORTABLE    CLINICAL HISTORY AND TECHNIQUE:  RT Victor M on 8/2/2023 12:21 PM    CURRENT CLINICAL HISTORY: ER PT    X PTA    -AMS    PAST MEDICAL HISTORY: SMOKER, CAD, HTN, COPD    TECHNIQUE: 1 VIEW CHEST PORTABLE    TECHNOLOGIST: LORETA    COMPARISON:  01/26/2023    FINDINGS:  The heart is moderately enlarged with vascular congestion and mildly increased interstitial lung  markings, all of which are exaggerated by the patient's size and poor inspiratory effort.I see no lobar or segmental infiltrates.No significant pleural effusions are noted.Mild degenerative changes are noted involving the thoracic spine.                                       CT Head Without Contrast (Final result)  Result time 08/02/23 12:06:52      Final result by Grey Riley III, MD (08/02/23 12:06:52)                   Impression:      1. No significant intracranial abnormalities are noted.  The ER physician was notified of these results at 12:05 on 08/02/2023 via telephone call.    COMMUNICATION  The ER physician was notified of these results at 12:05 on 08/02/2023 via telephone call.      Electronically signed by: Grey Riley  Date:    08/02/2023  Time:    12:06               Narrative:    EXAMINATION:  STUDY: CT HEAD WITHOUT CONTRAST    CLINICAL HISTORY AND TECHNIQUE:  Kya Thomas RT on 8/2/2023 12:01 PM    PT STATUS: ER    PROCEDURE: CT BRAIN WO    CLINICAL HX : RUE WEAKNESS, AMS 5 MIN. PTA    PMH: HTN, DM    IV CONTRAST: NONE    ORAL CONTRAST: NONE    RECTAL CONTRAST: NONE    AXIAL IMAGES @ 5MM INTERVALS WITH MULTIPLANAR RECONSTRUCTION    TOTAL IMAGE NUMBER: 32    NUMBER OF CT SCANS IN PAST 12 MONTHS: 1    CTDIvol(mGy): HEAD: 52.7    BODY:    DLP(mGycm): HEAD: 1210.0    BODY:    TECH: AKG/NN    PT TRANSPORTED W/O INCIDENT    This patient has had 1 CT and nuclear medicine scans performed within the last 12 months.    The following DOSE REDUCTION TECHNIQUES are used for all CT scans at [facility]:    1. Automated exposure control.  2. Adjustment of the mA and/or kv according to patient size.  3. Use of iterative reconstruction technique.    COMPARISON:  None    FINDINGS:  Axial imaging through the head/brain was performed.  I see no intraparenchymal masses, hemorrhagic lesions, or dominant wedge-shaped infarcts. I see no extra-axial masses or abnormal fluid collections.

## 2023-08-02 NOTE — SUBJECTIVE & OBJECTIVE
"  Woke up with symptoms?: no    Recent bleeding noted: no  Does the patient take any Blood Thinners? no  Medications: Antiplatelets:  aspirin      Past Medical History: hypertension, diabetes, CHF, kidney problems/dialysis and SHARAD, CMT    Past Surgical History: no major surgeries within the last 2 weeks    Family History: no relevant history    Social History: no smoking, no drinking, no drugs    Allergies: No Known Allergies     Review of Systems   Unable to perform ROS: Patient unresponsive   Endocrine: Negative for polyphagia.     Objective:   Vitals: Blood pressure (!) 163/88, pulse 85, resp. rate 18, height 5' 8" (1.727 m), weight (!) 146.5 kg (323 lb), SpO2 98 %.     CT READ: Yes  No hemmorhage. No mass effect. No early infarct signs.     Physical Exam  Vitals and nursing note reviewed.   Constitutional:       General: She is not in acute distress.     Appearance: Normal appearance. She is obese.   HENT:      Head: Normocephalic and atraumatic.      Nose: Nose normal.   Eyes:      Extraocular Movements: Extraocular movements intact.   Cardiovascular:      Rate and Rhythm: Normal rate and regular rhythm.   Pulmonary:      Effort: No respiratory distress.   Abdominal:      General: There is no distension.   Genitourinary:     Comments: NA  Musculoskeletal:      Cervical back: Normal range of motion.      Right lower leg: No edema.      Left lower leg: No edema.   Skin:     Findings: No erythema or rash.   Neurological:      Cranial Nerves: No cranial nerve deficit.      Sensory: Sensory deficit present.      Motor: Weakness present.      Comments: Awake but no following commands, no spontaneous verbal output, no gaze preference, generalized weakness.   Psychiatric:      Comments: Unable to assess           "

## 2023-08-02 NOTE — ASSESSMENT & PLAN NOTE
Continue SSI. Hold semaglutide, farxiga, and metformin. Most recent hemoglobin A1c from 7/27/2023 was 6.1.

## 2023-08-02 NOTE — HPI
Ms. Timmons is a 58 year old  female with a history of CAD, type II diabetes mellitus, HTN, SHARAD, and depression who presented to the ER today with altered mental status. She was in her normal state of health until this morning around 11 am when she developed a right sided headache. She didn't eat anything this morning when she took her medications and she believes that her blood sugar may have dropped. She states that she took her norco and klonopin last night. She became acutely lethargic and confused while talking to her family and her family called 911. On arrival to the ER she was hypertensive with a blood pressure of 163/88 and her initial labwork was remarkable for a AST of 91 and ALT of 99. Her urine drug screen was positive for opiates and her urinalysis was unremarkable. Her CXR showed a moderately enlarged heart with vascular congestion and mildly increased interstitial lung markings and CT of the head revealed no acute intracranial abnormality. CTA of the head and neck demonstrated no gross abnormalities and she was evaluated by teleneurology who did not recommend tPA. She has received narcan 2 mg IV and ketorolac 15 mg IV in the ER and her mentation has markedly improved. She is otherwise in stable condition and she has no other complaints today.

## 2023-08-02 NOTE — ASSESSMENT & PLAN NOTE
Her altered mental status has resolved and was likely due to oversedation from norco vs. hypoglycemia. Admission to the hospital was offered to the patient but declined. She refuses MRI of the brain due to claustrophobia and she states that she will make a follow-up appointment her PCP within the next week. This was discussed with the ER physician Dr. Mumtaz Pacheco who is in agreement.

## 2023-08-02 NOTE — ED PROVIDER NOTES
Encounter Date: 8/2/2023       History     Chief Complaint   Patient presents with    Altered Mental Status     Pt arrived per [facility] from home due to sudden onset of AMS, family reports episode started 5 minutes pta while talking amongst family, family reports pt became unresponsive     Patient arrives via EMS.  A family member is the historian.  Specifically her niece.  Patient by their assessment 20-30 minutes prior to arrival had the acute onset of altered mental status post with difficulty arouse confusion and no longer speaking.    The history is provided by a relative.     Review of patient's allergies indicates:  No Known Allergies  Past Medical History:   Diagnosis Date    Anxiety     Cataract     Charcot-Jasmyn-Tooth disease     Chronic diastolic CHF (congestive heart failure)     COPD (chronic obstructive pulmonary disease)     Coronary artery disease     Depression     GERD (gastroesophageal reflux disease)     Hypertension     Morbid obesity     Narcolepsy     Obstructive sleep apnea     Peripheral neuropathy     Type II diabetes mellitus      Past Surgical History:   Procedure Laterality Date    CHOLECYSTECTOMY      EXTRACTION OF CATARACT Right 05/23/2022    EXTRACTION OF CATARACT Left 05/04/2022    HYSTERECTOMY      TONSILLECTOMY       Family History   Problem Relation Age of Onset    Breast cancer Mother     Stroke Mother     Heart attack Father     Leukemia Father     Breast cancer Sister     Hypertension Brother      Social History     Tobacco Use    Smoking status: Every Day     Current packs/day: 0.00     Types: Vaping with nicotine    Smokeless tobacco: Never   Substance Use Topics    Drug use: Never     Review of Systems   Constitutional:  Positive for activity change.   Respiratory: Negative.     Cardiovascular: Negative.    Gastrointestinal: Negative.    Neurological:         Consciousness alteration, confusion, inability to speak       Physical Exam     Initial Vitals [08/02/23 1201]   BP  Pulse Resp Temp SpO2   (!) 163/88 85 18 -- 98 %      MAP       --         Physical Exam    HENT:   Head: Normocephalic and atraumatic.   Eyes: EOM are normal. Pupils are equal, round, and reactive to light.   Neck: Neck supple.   Normal range of motion.  Cardiovascular:  Normal rate, regular rhythm and normal heart sounds.           Pulmonary/Chest: Breath sounds normal.   Abdominal: Abdomen is soft. She exhibits distension.   Musculoskeletal:      Cervical back: Normal range of motion and neck supple.     Neurological:   Somnolent, follows commands, dysarthria, bilateral ext. Weakness.  Eomi,    Skin: Skin is warm and dry.         ED Course   Critical Care    Date/Time: 8/2/2023 11:53 AM    Performed by: Mumtaz Pacheco MD  Authorized by: Mumtaz Pacheco MD  Direct patient critical care time: 15 minutes  Additional history critical care time: 10 minutes  Ordering / reviewing critical care time: 13 minutes  Documentation critical care time: 12 minutes  Consulting other physicians critical care time: 8 minutes  Consult with family critical care time: 8 minutes  Total critical care time (exclusive of procedural time) : 66 minutes  Critical care was necessary to treat or prevent imminent or life-threatening deterioration of the following conditions: CNS failure or compromise.  Critical care was time spent personally by me on the following activities: development of treatment plan with patient or surrogate, discussions with consultants, interpretation of cardiac output measurements, evaluation of patient's response to treatment, examination of patient, obtaining history from patient or surrogate, ordering and performing treatments and interventions, ordering and review of laboratory studies, ordering and review of radiographic studies, pulse oximetry and re-evaluation of patient's condition.        Labs Reviewed   COMPREHENSIVE METABOLIC PANEL - Abnormal; Notable for the following components:       Result Value     Chloride 96 (*)     Blood Urea Nitrogen 23.0 (*)     Albumin Level 5.2 (*)     Alanine Aminotransferase 99 (*)     Aspartate Aminotransferase 91 (*)     Anion Gap 14.0 (*)     BUN/Creatinine Ratio 30 (*)     All other components within normal limits   MAGNESIUM - Abnormal; Notable for the following components:    Magnesium Level 2.60 (*)     All other components within normal limits   URINALYSIS - Abnormal; Notable for the following components:    Glucose,  (*)     All other components within normal limits    Narrative:      URINE STABILITY IS 2 HOURS AT ROOM TEMP OR    SIX HOURS REFRIGERATED. PERFORMING TESTING ON    SPECIMENS GREATER THAN THIS AGE MAY AFFECT THE    FOLLOWING TESTS:    PH          SPECIFIC GRAVITY           BLOOD    CLARITY     BILIRUBIN               UROBILINOGEN   CBC WITH DIFFERENTIAL - Abnormal; Notable for the following components:    RBC 5.33 (*)     MPV 9.3 (*)     Mono # 0.50 (*)     Baso # 0.09 (*)     All other components within normal limits   DRUG SCREEN, URINE (BEAKER) - Abnormal; Notable for the following components:    Opiates, Urine Positive (*)     All other components within normal limits    Narrative:     Cut off concentrations:    Amphetamines - 1000 ng/ml  Barbiturates - 200 ng/ml  Benzodiazepine - 200 ng/ml  Cannabinoids (THC) - 50 ng/ml  Cocaine - 300 ng/ml  Fentanyl - 1.0 ng/ml  MDMA - 500 ng/ml  Opiates - 300 ng/ml   Phencyclidine (PCP) - 25 ng/ml  Methadone - 300 ng/ml      False negatives may result form substances such as bleach added to urine.  False positives may result for the presence of a substance with similar chemical structure to the drug or its metabolite.    This test provides only a PRELIMINARY analytical test result. A more specific alternate chemical method must be used in order to obtain a confirmed analytical result. Gas chromatography/mass spectrometry (GC/MS) is the preferred confirmatory method. Other chemical confirmation methods are available.  Clinical consideration and professional judgement should be applied to any drug of abuse test result, particularly when preliminary positive results are used.    Positive results will be confirmed only at the physicians request. Unconfirmed screening results are to be used only for medical purposes (treatment).          APTT - Normal   AMMONIA - Normal   CBC W/ AUTO DIFFERENTIAL    Narrative:     The following orders were created for panel order CBC auto differential.  Procedure                               Abnormality         Status                     ---------                               -----------         ------                     CBC with Differential[[id number]]        Abnormal            Final result                 Please view results for these tests on the individual orders.   PROTIME-INR     EKG Readings: (Independently Interpreted)   Rhythm: Normal Sinus Rhythm. Heart Rate: 84. Ectopy: No Ectopy. Conduction: RBBB. ST Segments: Normal ST Segments. T Waves Flipped: III. Q Waves: III and AVF.       Imaging Results              CTA Brain (Final result)  Result time 08/02/23 14:33:24      Final result by Grey Riley III, MD (08/02/23 14:33:24)                   Impression:      1. No gross abnormalities are noted on less than optimal imaging.  See above comments for details as well as limitations of this examination.      Electronically signed by: Grey Riley  Date:    08/02/2023  Time:    14:33               Narrative:    EXAMINATION:  STUDY: CTA HEAD    CLINICAL HISTORY AND TECHNIQUE:  Kya Thomas, RT on 8/2/2023  2:23 PM    PT STATUS: ER    PROCEDURE: CTA BRAIN    CLINICAL HX : RUE WEAKNESS, AMS 5 MIN. PTA    PMH: HTN, DM    IV CONTRAST: 100CC ISOVUE 370    ORAL CONTRAST: NONE    RECTAL CONTRAST: NONE    AXIAL IMAGES @ 5MM INTERVALS WITH MULTIPLANAR & 3D RECONSTRUCTION    TOTAL IMAGE NUMBER: 112    NUMBER OF CT SCANS IN PAST 12 MONTHS: 2    CTDIvol(mGy): HEAD: 358.8   BODY:    DLP(mGycm): HEAD: 961.5     BODY:    TECH: AKG/NN    PT TRANSPORTED W/O INCIDENT    This patient has had 2 CT and nuclear medicine scans performed within the last 12 months.    The following DOSE REDUCTION TECHNIQUES are used for all CT scans at [facility]:    1. Automated exposure control.  2. Adjustment of the mA and/or kv according to patient size.  3. Use of iterative reconstruction technique.    COMPARISON:  CT scan of the head/brain performed earlier the same day    FINDINGS:  CTA of the [place] was performed.  It should be noted that MRA is typically superior to CTA of the [place] due to subtraction artifact related to the process of subtraction of the skeletal structures of the skull base from the vascular structures which often obscures fine detail on CTA.  The quality of this examination is less than optimal due to fairly pronounced subtraction artifact.  The [place] and its primary branches and tributaries are best delineated on the raw data images and appear grossly unremarkable with no obvious hemodynamically significant stenosis, aneurysmal dilatation or other significant abnormalities.                                       X-Ray Chest AP Portable (Final result)  Result time 08/02/23 12:42:41      Final result by Grey Riley III, MD (08/02/23 12:42:41)                   Impression:      1. The heart is moderately enlarged with vascular congestion and mildly increased interstitial lung markings.      Electronically signed by: Grey Riley  Date:    08/02/2023  Time:    12:42               Narrative:    EXAMINATION:  STUDY: XR CHEST AP PORTABLE    CLINICAL HISTORY AND TECHNIQUE:  Yair Kenney RT on 8/2/2023 12:21 PM    CURRENT CLINICAL HISTORY: ER PT    X PTA    -AMS    PAST MEDICAL HISTORY: SMOKER, CAD, HTN, COPD    TECHNIQUE: 1 VIEW CHEST PORTABLE    TECHNOLOGIST: LORETA    COMPARISON:  01/26/2023    FINDINGS:  The heart is moderately enlarged with vascular congestion and mildly  increased interstitial lung markings, all of which are exaggerated by the patient's size and poor inspiratory effort.I see no lobar or segmental infiltrates.No significant pleural effusions are noted.Mild degenerative changes are noted involving the thoracic spine.                                       CT Head Without Contrast (Final result)  Result time 08/02/23 12:06:52      Final result by Grey Riley III, MD (08/02/23 12:06:52)                   Impression:      1. No significant intracranial abnormalities are noted.  The ER physician was notified of these results at 12:05 on 08/02/2023 via telephone call.    COMMUNICATION  The ER physician was notified of these results at 12:05 on 08/02/2023 via telephone call.      Electronically signed by: Grey Riley  Date:    08/02/2023  Time:    12:06               Narrative:    EXAMINATION:  STUDY: CT HEAD WITHOUT CONTRAST    CLINICAL HISTORY AND TECHNIQUE:  Kya Thomas RT on 8/2/2023 12:01 PM    PT STATUS: ER    PROCEDURE: CT BRAIN WO    CLINICAL HX : RUE WEAKNESS, AMS 5 MIN. PTA    PMH: HTN, DM    IV CONTRAST: NONE    ORAL CONTRAST: NONE    RECTAL CONTRAST: NONE    AXIAL IMAGES @ 5MM INTERVALS WITH MULTIPLANAR RECONSTRUCTION    TOTAL IMAGE NUMBER: 32    NUMBER OF CT SCANS IN PAST 12 MONTHS: 1    CTDIvol(mGy): HEAD: 52.7    BODY:    DLP(mGycm): HEAD: 1210.0    BODY:    TECH: AKG/NN    PT TRANSPORTED W/O INCIDENT    This patient has had 1 CT and nuclear medicine scans performed within the last 12 months.    The following DOSE REDUCTION TECHNIQUES are used for all CT scans at [facility]:    1. Automated exposure control.  2. Adjustment of the mA and/or kv according to patient size.  3. Use of iterative reconstruction technique.    COMPARISON:  None    FINDINGS:  Axial imaging through the head/brain was performed.  I see no intraparenchymal masses, hemorrhagic lesions, or dominant wedge-shaped infarcts. I see no extra-axial masses or abnormal fluid  collections.                                       Medications   ketorolac injection 15 mg (15 mg Intravenous Not Given 8/2/23 1400)   naloxone injection 2 mg (2 mg Intravenous Given 8/2/23 1330)   iopamidoL (ISOVUE-370) injection 100 mL (100 mLs Intravenous Given 8/2/23 1405)     Medical Decision Making:   Differential Diagnosis:   Hypoglycemia, metabolic encephalopathy, cva, cerebral hemorrhage, sepsis  Clinical Tests:   Lab Tests: Ordered and Reviewed  The following lab test(s) were unremarkable: CBC, CMP, PT and PTT  Radiological Study: Ordered and Reviewed  Medical Tests: Ordered and Reviewed  Other:   I have discussed this case with another health care provider.       <> Summary of the Discussion: Tele neurology consult performed.  The specialist did not feel the patient was appropriate for tPA at this time.  He recommended a CT angio or MRA of the head.  I informed him that we cannot perform these procedures out your this facility.  Dinora he recommended transfer of the patient for appropriate imaging the definitive management.  Patient began having notable improvement in her symptomatology.  She is now spontaneously awake without need for arousable.  She is complaining of a headache and left hip pain.  And requiring requesting medication for such.  She is now following all commands.  Ocular muscles are intact.  No facial palsy is noted.  Review of the [facility] website shows the patient takes concomitant benzodiazepines of Klonopin 1 mg twice a day and Norco 10 120 per month.  Narcan 2 mg IV was given to the patient and further improvement has been noted.  We have been informed that we are able at this time to perform a CT of the head and we will proceed with that.  If no significant findings were noted on that she will be admitted here to the hospital.  We will order an MRI in the morning and if that does not show evidence of cerebral ischemia then it will make most likely a definitive diagnosis of medication  induced altered mental status.  Furthermore, I would discuss the case with the patient's PCP, Dr. Rodriguez.  The tele hospitalist performed the consultation with the patient.  The patient states that she will review the MRI.  The plan at this time the patient will sign herself out AMA she does not want further evaluation and testing.                          Clinical Impression:   Final diagnoses:  [R05.9] Cough in adult  [R41.82] Altered mental status, unspecified altered mental status type (Primary)  [R40.4] Transient alteration of awareness        ED Disposition Condition    AMA Stable                Mumtaz Pacheco MD  08/02/23 1520       Mumtaz Pacheco MD  08/02/23 1533       uMmtaz Pacheco MD  08/02/23 1610

## 2023-08-02 NOTE — CONSULTS
[facility]  Vascular Neurology  Comprehensive Stroke Center  TeleVascular Neurology Acute Consultation Note      Consults    Consulting Provider: RITU DIXON.  Current Providers  No providers found    Patient Location:  [facility] EMERGENCY DEPARTMENT Emergency Department  Spoke hospital nurse at bedside with patient assisting consultant.     Patient information was obtained from relative(s), past medical records, and primary team.         Assessment/Plan:   59 y/o with multiple medica problems as outlined below, brought in due to acute onset unresponsiveness.  NCCT head normal.  Broad differential including seizures, encephalopathy, stroke. Will not recommend treating with TNK at this moment.  Recommended transfer to nearest appropriate facility for MRI/MRA brain and neck, EEG, serologies, UA, UDS.          Diagnoses: unresponsiveness.   No new Assessment & Plan notes have been filed under this hospital service since the last note was generated.  Service: Vascular Neurology      STROKE DOCUMENTATION     Acute Stroke Times:   Acute Stroke Times   Last Known Normal Date: 08/02/23  Last Known Normal Time: 1100  Symptom Onset Date: 08/02/23  Symptom Onset Time: 1100  Stroke Team Called Date: 08/02/23  Stroke Team Called Time: 0014  Stroke Team Arrival Date: 08/02/23  Stroke Team Arrival Time: 1220  CT Interpretation Time: 1220  Thrombolytic Therapy Recommended: No  Thrombectomy Recommended:  (Pending vascular imging)    NIH Scale:  Interval: baseline  1a. Level of Consciousness: 2-->Not alert, requires repeated stimulation to attend, or is obtunded and requires strong or painful stimulation to make movements (not stereotyped)  1b. LOC Questions: 2-->Answers neither question correctly  1c. LOC Commands: 2-->Performs neither task correctly  2. Best Gaze: 0-->Normal  3. Visual: 0-->No visual loss  4. Facial Palsy: 0-->Normal symmetrical movements  5a. Motor Arm, Left: 3-->No effort  "against gravity, limb falls  5b. Motor Arm, Right: 3-->No effort against gravity, limb falls  6a. Motor Leg, Left: 3-->No effort against gravity, leg falls to bed immediately  6b. Motor Leg, Right: 3-->No effort against gravity, leg falls to bed immediately  7. Limb Ataxia: 0-->Absent  8. Sensory: 1-->Mild-to-moderate sensory loss, patient feels pinprick is less sharp or is dull on the affected side, or there is a loss of superficial pain with pinprick, but patient is aware of being touched  9. Best Language: 3-->Mute, global aphasia, no usable speech or auditory comprehension  10. Dysarthria: 0-->Normal  11. Extinction and Inattention (formerly Neglect): 0-->No abnormality  Total (NIH Stroke Scale): 22     Modified [place] Score: 0  [place] Coma Scale:3   ABCD2 Score:    [id number] Score:   HAS -BLED Score:   ICH Score:   Hunt & [place] Classification:       Blood pressure (!) 155/85, pulse 80, resp. rate 14, height 5' 8" (1.727 m), weight (!) 146.5 kg (323 lb), SpO2 97 %.  Eligible for thrombolytic therapy?: No  Thrombolytic therapy recomended: Thrombolytic therapy not recommended due to Suspected stroke mimic   Possible Interventional Revascularization Candidate? Awaiting CTA results from Spoke for determination     Disposition Recommendation: transfer to nearest appropriate facility     Subjective:     History of Present Illness: 57 y/o with multiple medica problems as outlined below, brought in due to acute onset unresponsiveness. Never had similar symptoms before.  No improving.  No notes on file      Woke up with symptoms?: no    Recent bleeding noted: no  Does the patient take any Blood Thinners? no  Medications: Antiplatelets:  aspirin      Past Medical History: hypertension, diabetes, CHF, kidney problems/dialysis and SHARAD, CMT    Past Surgical History: no major surgeries within the last 2 weeks    Family History: no relevant history    Social History: no smoking, no drinking, no drugs    Allergies: No Known " "Allergies     Review of Systems   Unable to perform ROS: Patient unresponsive   Endocrine: Negative for polyphagia.     Objective:   Vitals: Blood pressure (!) 163/88, pulse 85, resp. rate 18, height 5' 8" (1.727 m), weight (!) 146.5 kg (323 lb), SpO2 98 %.     CT READ: Yes  No hemmorhage. No mass effect. No early infarct signs.     Physical Exam  Vitals and nursing note reviewed.   Constitutional:       General: She is not in acute distress.     Appearance: Normal appearance. She is obese.   HENT:      Head: Normocephalic and atraumatic.      Nose: Nose normal.   Eyes:      Extraocular Movements: Extraocular movements intact.   Cardiovascular:      Rate and Rhythm: Normal rate and regular rhythm.   Pulmonary:      Effort: No respiratory distress.   Abdominal:      General: There is no distension.   Genitourinary:     Comments: NA  Musculoskeletal:      Cervical back: Normal range of motion.      Right lower leg: No edema.      Left lower leg: No edema.   Skin:     Findings: No erythema or rash.   Neurological:      Cranial Nerves: No cranial nerve deficit.      Sensory: Sensory deficit present.      Motor: Weakness present.      Comments: Awake but no following commands, no spontaneous verbal output, no gaze preference, generalized weakness.   Psychiatric:      Comments: Unable to assess             Recommended the emergency room physician to have a brief discussion with the patient and/or family if available regarding the  risks and benefits of treatment, and to briefly document the occurrence of that discussion in his clinical encounter note.     The attending portion of this evaluation, treatment, and documentation was performed per Jordan Brock MD via audiovisual.    Billing code:  (non-intervention mild to moderate stroke, TIA, some mimics)      This patient has a critical neurological condition/illness, with some potential for high morbidity and mortality.  There is a moderate probability for acute " neurological change leading to clinical and possibly life-threatening deterioration requiring highest level of physician preparedness for urgent intervention.  Care was coordinated with other physicians involved in the patient's care.  Radiologic studies and laboratory data were reviewed and interpreted, and plan of care was re-assessed based on the results.  Diagnosis, treatment options and prognosis may have been discussed with the patient and/or family members or caregiver.    In your opinion, this was a: Tier 1 Van Positive    Consult End Time: 1225 pm    Jordan Brock MD  Comprehensive Stroke Center  Vascular Neurology   [facility]

## 2023-08-03 ENCOUNTER — TELEPHONE (OUTPATIENT)
Dept: FAMILY MEDICINE | Facility: CLINIC | Age: 58
End: 2023-08-03
Payer: MEDICARE

## 2023-08-03 LAB — POCT GLUCOSE: 76 MG/DL (ref 70–110)

## 2023-08-03 NOTE — TELEPHONE ENCOUNTER
Pt states that she was transported to the ER yesterday. ER told her that she was on too much medications. She is wanting to come in asap to speak with Dr. Marino, but she is wanting to speak with a nurse before we put her on the schedule.       Pt is asking that someone call her before 12 so that the caregiver can speak to the office.

## 2023-08-04 DIAGNOSIS — J31.0 CHRONIC RHINITIS: Primary | ICD-10-CM

## 2023-08-04 RX ORDER — FLUTICASONE PROPIONATE 50 MCG
2 SPRAY, SUSPENSION (ML) NASAL
Qty: 16 G | Refills: 6 | Status: SHIPPED | OUTPATIENT
Start: 2023-08-04 | End: 2024-01-22

## 2023-08-08 ENCOUNTER — OFFICE VISIT (OUTPATIENT)
Dept: FAMILY MEDICINE | Facility: CLINIC | Age: 58
End: 2023-08-08
Payer: MEDICARE

## 2023-08-08 VITALS
DIASTOLIC BLOOD PRESSURE: 72 MMHG | WEIGHT: 293 LBS | BODY MASS INDEX: 44.41 KG/M2 | HEART RATE: 88 BPM | OXYGEN SATURATION: 97 % | HEIGHT: 68 IN | TEMPERATURE: 97 F | SYSTOLIC BLOOD PRESSURE: 130 MMHG

## 2023-08-08 DIAGNOSIS — E11.69 TYPE 2 DIABETES MELLITUS WITH OBESITY: ICD-10-CM

## 2023-08-08 DIAGNOSIS — J43.9 PULMONARY EMPHYSEMA, UNSPECIFIED EMPHYSEMA TYPE: ICD-10-CM

## 2023-08-08 DIAGNOSIS — G47.33 OBSTRUCTIVE SLEEP APNEA SYNDROME: ICD-10-CM

## 2023-08-08 DIAGNOSIS — N18.30 STAGE 3 CHRONIC KIDNEY DISEASE, UNSPECIFIED WHETHER STAGE 3A OR 3B CKD: ICD-10-CM

## 2023-08-08 DIAGNOSIS — E66.9 TYPE 2 DIABETES MELLITUS WITH OBESITY: ICD-10-CM

## 2023-08-08 DIAGNOSIS — Z12.11 COLON CANCER SCREENING: ICD-10-CM

## 2023-08-08 DIAGNOSIS — R26.81 UNSTEADINESS ON FEET: ICD-10-CM

## 2023-08-08 DIAGNOSIS — E11.22 TYPE 2 DIABETES MELLITUS WITH DIABETIC CHRONIC KIDNEY DISEASE: ICD-10-CM

## 2023-08-08 DIAGNOSIS — R74.01 TRANSAMINITIS: ICD-10-CM

## 2023-08-08 DIAGNOSIS — G89.4 CHRONIC PAIN SYNDROME: ICD-10-CM

## 2023-08-08 DIAGNOSIS — G60.0 HEREDITARY SENSORIMOTOR NEUROPATHY: Primary | ICD-10-CM

## 2023-08-08 DIAGNOSIS — Z12.31 ENCOUNTER FOR SCREENING MAMMOGRAM FOR BREAST CANCER: ICD-10-CM

## 2023-08-08 PROBLEM — E66.813 CLASS 3 SEVERE OBESITY WITH SERIOUS COMORBIDITY IN ADULT: Status: ACTIVE | Noted: 2023-01-09

## 2023-08-08 PROCEDURE — 99214 PR OFFICE/OUTPT VISIT, EST, LEVL IV, 30-39 MIN: ICD-10-PCS | Mod: ,,, | Performed by: FAMILY MEDICINE

## 2023-08-08 PROCEDURE — 99214 OFFICE O/P EST MOD 30 MIN: CPT | Mod: ,,, | Performed by: FAMILY MEDICINE

## 2023-08-08 RX ORDER — NALOXONE HYDROCHLORIDE 4 MG/.1ML
1 SPRAY NASAL ONCE
Qty: 1 EACH | Refills: 0 | COMMUNITY
Start: 2023-08-08 | End: 2023-08-08

## 2023-08-08 NOTE — PROGRESS NOTES
SUBJECTIVE:  Daya Timmons is a 58 y.o. female here for ER Follow up      HPI  Patient is here for follow-up on chronic conditions as well as a recent ER visit.  She went to the emergency room having some headaches but was found to be lethargic.  They ended up giving her some Narcan and this seemed to revive her somewhat though the patient and her caregiver today say she was starting to become more weight before the Narcan was given.  She does take hydrocodone on average 40 mg daily.  She usually takes 2 10 mg tablets in the morning.  She also is on gabapentin and Klonopin which she has been on for years.  She also uses BiPAP for severe obstructive sleep apnea.  She also says when she feels her blood sugar gets below 100 she feels rather lethargic and lately it has been down to 80 in the mornings.  Daya's allergies, medications, history, and problem list were updated as appropriate.    Review of Systems   See HPI.  Has chronic shortness of breath fatigue and chronic joint pains    Recent Results (from the past 504 hour(s))   CBC Without Differential    Collection Time: 07/27/23  8:55 AM   Result Value Ref Range    WBC 7.71 4.00 - 11.50 x10(3)/mcL    RBC 5.40 (H) 4.00 - 5.10 x10(6)/mcL    Hgb 15.1 11.8 - 16.0 g/dL    Hct 46.3 36.0 - 48.0 %    MCV 85.7 79.0 - 99.0 fL    MCH 28.0 27.0 - 34.0 pg    MCHC 32.6 31.0 - 37.0 g/dL    RDW 14.3 11.0 - 14.5 %    Platelet 219 140 - 371 x10(3)/mcL    MPV 9.7 9.4 - 12.4 fL    NRBC% 0.0 <=1 %   Hemoglobin A1C    Collection Time: 07/27/23  8:55 AM   Result Value Ref Range    Hemoglobin A1c 6.1 (H) 4.0 - 6.0 %    Estimated Average Glucose 128.4 (H) 70.0 - 115.0 mg/dL   Comprehensive Metabolic Panel    Collection Time: 07/27/23  8:55 AM   Result Value Ref Range    Sodium Level 141 135 - 145 mmol/L    Potassium Level 4.6 3.5 - 5.1 mmol/L    Chloride 99 98 - 110 mmol/L    Carbon Dioxide 29 21 - 32 mmol/L    Glucose Level 112 70 - 115 mg/dL    Blood Urea Nitrogen 15.0 7.0 - 20.0 mg/dL     Creatinine 0.74 0.66 - 1.25 mg/dL    Calcium Level Total 9.6 8.4 - 10.2 mg/dL    Protein Total 7.5 6.3 - 8.2 gm/dL    Albumin Level 5.0 3.4 - 5.0 g/dL    Globulin 2.5 2.0 - 3.9 gm/dL    Albumin/Globulin Ratio 2.0 ratio    Bilirubin Total 0.6 0.0 - 1.0 mg/dL    Alkaline Phosphatase 94 50 - 144 unit/L    Alanine Aminotransferase 120 (H) 1 - 45 unit/L    Aspartate Aminotransferase 110 (H) 14 - 36 unit/L    eGFR >90 mls/min/1.73/m2    Anion Gap 13.0 2.0 - 13.0 mEq/L    BUN/Creatinine Ratio 20 12 - 20   Lipid Panel    Collection Time: 07/27/23  8:55 AM   Result Value Ref Range    Cholesterol Total 113 0 - 200 mg/dL    HDL Cholesterol 40 40 - 60 mg/dL    Triglyceride 162 30 - 200 mg/dL    LDL Cholesterol Direct 53.3 30.0 - 100.0 mg/dL   POCT glucose    Collection Time: 08/02/23 11:55 AM   Result Value Ref Range    POCT Glucose 76 70 - 110 mg/dL   Comprehensive metabolic panel    Collection Time: 08/02/23 12:22 PM   Result Value Ref Range    Sodium Level 136 135 - 145 mmol/L    Potassium Level 4.0 3.5 - 5.1 mmol/L    Chloride 96 (L) 98 - 110 mmol/L    Carbon Dioxide 26 21 - 32 mmol/L    Glucose Level 84 70 - 115 mg/dL    Blood Urea Nitrogen 23.0 (H) 7.0 - 20.0 mg/dL    Creatinine 0.77 0.66 - 1.25 mg/dL    Calcium Level Total 9.8 8.4 - 10.2 mg/dL    Protein Total 8.0 6.3 - 8.2 gm/dL    Albumin Level 5.2 (H) 3.4 - 5.0 g/dL    Globulin 2.8 2.0 - 3.9 gm/dL    Albumin/Globulin Ratio 1.9 ratio    Bilirubin Total 0.6 0.0 - 1.0 mg/dL    Alkaline Phosphatase 110 50 - 144 unit/L    Alanine Aminotransferase 99 (H) 1 - 45 unit/L    Aspartate Aminotransferase 91 (H) 14 - 36 unit/L    eGFR 90 mls/min/1.73/m2    Anion Gap 14.0 (H) 2.0 - 13.0 mEq/L    BUN/Creatinine Ratio 30 (H) 12 - 20   Protime-INR    Collection Time: 08/02/23 12:22 PM   Result Value Ref Range    PT 10.2 9.3 - 11.9 seconds    INR 1.0 <5.0   APTT    Collection Time: 08/02/23 12:22 PM   Result Value Ref Range    PTT 25.6 23.0 - 29.4 seconds   Magnesium    Collection Time:  08/02/23 12:22 PM   Result Value Ref Range    Magnesium Level 2.60 (H) 1.80 - 2.40 mg/dL   Ammonia    Collection Time: 08/02/23 12:22 PM   Result Value Ref Range    Ammonia Level 22.0 11.0 - 32.0 umol/L   CBC with Differential    Collection Time: 08/02/23 12:22 PM   Result Value Ref Range    WBC 8.51 4.00 - 11.50 x10(3)/mcL    RBC 5.33 (H) 4.00 - 5.10 x10(6)/mcL    Hgb 14.7 11.8 - 16.0 g/dL    Hct 44.9 36.0 - 48.0 %    MCV 84.2 79.0 - 99.0 fL    MCH 27.6 27.0 - 34.0 pg    MCHC 32.7 31.0 - 37.0 g/dL    RDW 14.2 11.0 - 14.5 %    Platelet 221 140 - 371 x10(3)/mcL    MPV 9.3 (L) 9.4 - 12.4 fL    Neut % 55.9 37 - 73 %    Lymph % 33.8 20 - 55 %    Mono % 5.9 4.7 - 12.5 %    Eos % 3.1 0.7 - 7 %    Basophil % 1.1 0.1 - 1.2 %    Lymph # 2.88 1.16 - 3.74 x10(3)/mcL    Neut # 4.76 1.56 - 6.13 x10(3)/mcL    Mono # 0.50 (H) 0.24 - 0.36 x10(3)/mcL    Eos # 0.26 0.04 - 0.36 x10(3)/mcL    Baso # 0.09 (H) 0.01 - 0.08 x10(3)/mcL    IG# 0.02 0.0001 - 0.031 x10(3)/mcL    IG% 0.2 0 - 0.5 %    NRBC% 0.0 <=1 %   Urinalysis    Collection Time: 08/02/23 12:50 PM   Result Value Ref Range    Color, UA Yellow Yellow, Light-Yellow, Dark Yellow, [place], Straw    Appearance, UA Clear Clear    Specific Gravity, UA <=1.005     pH, UA 6.0 5.0 - 8.5    Protein, UA Negative Negative    Glucose,  (A) Negative, Normal    Ketones, UA Negative Negative    Blood, UA Negative Negative    Bilirubin, UA Negative Negative    Urobilinogen, UA 0.2 0.2, 1.0, Normal    Nitrites, UA Negative Negative    Leukocyte Esterase, UA Negative Negative   Drug Screen, Urine    Collection Time: 08/02/23 12:50 PM   Result Value Ref Range    Amphetamines, Urine Negative Negative    Barbituates, Urine Negative Negative    Benzodiazepine, Urine Negative Negative    Cannabinoids, Urine Negative Negative    Cocaine, Urine Negative Negative    Opiates, Urine Positive (A) Negative    Phencyclidine, Urine Negative Negative    pH, Urine 6.0 3.0 - 11.0    Methadone, Urine Negative  "Negative   POCT glucose    Collection Time: 08/02/23  4:13 PM   Result Value Ref Range    POCT Glucose 91 70 - 110 mg/dL       OBJECTIVE:  Vital signs  Vitals:    08/08/23 1040   BP: 130/72   BP Location: Right arm   Patient Position: Sitting   BP Method: Large (Manual)   Pulse: 88   Temp: 97.1 °F (36.2 °C)   TempSrc: Oral   SpO2: 97%   Weight: (!) 143.1 kg (315 lb 6.4 oz)   Height: 5' 7.72" (1.72 m)        Physical Exam she is  obese, heart with a regular rate and rhythm, lungs diminished but clear    ASSESSMENT/PLAN:  1. Hereditary sensorimotor neuropathy  She has a history of Charcot-Jasmyn-Tooth disease and has a lot of neuropathy and pain from this.    2. Chronic pain syndrome  We discussed that chronic opioid use along with gabapentin and benzodiazepines can certainly cause some sedation.  She does not feel that this has a major issue at this time.  We talked about other factors in addition to the narcotics and gabapentin and benzos could also worsen sedation like not using CPAP properly or blood sugar dropping.  We felt it safest now for her to have naloxone at home if ever needed.  We will also discuss possibly in the future switching her to buprenorphine for chronic pain  -     naloxone (NARCAN) 4 mg/actuation Spry; 1 spray (4 mg total) by Nasal route once. for 1 dose  Dispense: 1 each; Refill: 0    3. Pulmonary emphysema, unspecified emphysema type  Stable on albuterol 4 times a day    4. Obstructive sleep apnea syndrome  She is compliant with her BiPAP    5. Transaminitis  She is persistently had elevated liver enzymes which we have felt or fatty liver but we need to check hepatitis-C level also    6. Stage 3 chronic kidney disease, unspecified whether stage 3a or 3b CKD  Most recent GFR was 90 which is actually improved from past GFR levels    7. Diabetes - recent A1c was 6.1.  She is on metformin and Ozempic.  She was checking her sugars multiple times a day and requesting a Dexcom I told her with the " way her A1c is we do not really need to check her sugar daily any longer and only check it if she is symptomatic.  It was almost like she was becoming a little pervasive about checking her sugar wanting to know what it was all of the time               Follow Up:  Follow up in about 3 months (around 11/8/2023) for recheck, fasting labs.

## 2023-08-10 ENCOUNTER — TELEPHONE (OUTPATIENT)
Dept: FAMILY MEDICINE | Facility: CLINIC | Age: 58
End: 2023-08-10
Payer: MEDICARE

## 2023-08-10 DIAGNOSIS — N18.31 TYPE 2 DIABETES MELLITUS WITH STAGE 3A CHRONIC KIDNEY DISEASE, WITHOUT LONG-TERM CURRENT USE OF INSULIN: Primary | ICD-10-CM

## 2023-08-10 DIAGNOSIS — E11.22 TYPE 2 DIABETES MELLITUS WITH STAGE 3A CHRONIC KIDNEY DISEASE, WITHOUT LONG-TERM CURRENT USE OF INSULIN: Primary | ICD-10-CM

## 2023-08-10 RX ORDER — METFORMIN HYDROCHLORIDE 1000 MG/1
1000 TABLET ORAL 2 TIMES DAILY
Qty: 180 TABLET | Refills: 1 | Status: SHIPPED | OUTPATIENT
Start: 2023-08-10 | End: 2024-01-11 | Stop reason: SDUPTHER

## 2023-08-16 ENCOUNTER — TELEPHONE (OUTPATIENT)
Dept: FAMILY MEDICINE | Facility: CLINIC | Age: 58
End: 2023-08-16
Payer: MEDICARE

## 2023-08-16 NOTE — TELEPHONE ENCOUNTER
Pt wanted to go to higher dosage of Ozempic. States that they were losing medication because she was running out of pen needles so was just throwing extra medication away. Pt is on 1 mg dosage so she should not have anything left over after 4 weeks.

## 2023-08-21 ENCOUNTER — TELEPHONE (OUTPATIENT)
Dept: FAMILY MEDICINE | Facility: CLINIC | Age: 58
End: 2023-08-21
Payer: MEDICARE

## 2023-08-21 DIAGNOSIS — S49.91XA INJURY OF RIGHT SHOULDER, INITIAL ENCOUNTER: ICD-10-CM
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
## 2023-08-22 ENCOUNTER — APPOINTMENT (OUTPATIENT)
Dept: RADIOLOGY | Facility: CLINIC | Age: 58
End: 2023-08-22
Attending: FAMILY MEDICINE
Payer: MEDICARE

## 2023-08-22 ENCOUNTER — TELEPHONE (OUTPATIENT)
Dept: FAMILY MEDICINE | Facility: CLINIC | Age: 58
End: 2023-08-22
Payer: MEDICARE

## 2023-08-22 ENCOUNTER — OFFICE VISIT (OUTPATIENT)
Dept: FAMILY MEDICINE | Facility: CLINIC | Age: 58
End: 2023-08-22
Payer: MEDICARE

## 2023-08-22 VITALS
TEMPERATURE: 98 F | HEART RATE: 91 BPM | OXYGEN SATURATION: 96 % | SYSTOLIC BLOOD PRESSURE: 160 MMHG | DIASTOLIC BLOOD PRESSURE: 92 MMHG

## 2023-08-22 DIAGNOSIS — M25.511 ACUTE PAIN OF RIGHT SHOULDER: Primary | ICD-10-CM

## 2023-08-22 DIAGNOSIS — M25.511 ACUTE PAIN OF RIGHT SHOULDER: ICD-10-CM

## 2023-08-22 DIAGNOSIS — M25.552 LEFT HIP PAIN: ICD-10-CM

## 2023-08-22 PROCEDURE — 73030 X-RAY EXAM OF SHOULDER: CPT | Mod: TC,RHCUB,RT | Performed by: FAMILY MEDICINE

## 2023-08-22 PROCEDURE — 99214 OFFICE O/P EST MOD 30 MIN: CPT | Mod: ,,, | Performed by: FAMILY MEDICINE

## 2023-08-22 PROCEDURE — 99214 PR OFFICE/OUTPT VISIT, EST, LEVL IV, 30-39 MIN: ICD-10-PCS | Mod: ,,, | Performed by: FAMILY MEDICINE

## 2023-08-22 NOTE — TELEPHONE ENCOUNTER
----- Message from Kahlil Rodriguez MD sent at 8/21/2023  3:08 PM CDT -----  The x-ray says it looks like there is a healing fracture of the shoulder.  Can she come in for me to check and make sure this is not an acute fracture  ----- Message -----  From: Shalini Clark RT  Sent: 8/21/2023   2:27 PM CDT  To: Kahlil Rodriguez MD

## 2023-08-22 NOTE — PROGRESS NOTES
SUBJECTIVE:  Daya Timmons is a 58 y.o. female here for Fall (Fell last Sunday and possible right arm fracture)      HPI  Patient had a fall at home yesterday morning.  We sent mobile x-ray out x-ray her and there was suggestion of an old healing fracture of the right humerus.  Her arm is in a sling and doing well in this but she is having some pain in the shoulder region.  She is also having a lot of left hip pain and left knee pain.  Daya's allergies, medications, history, and problem list were updated as appropriate.    Review of Systems   See HPI    Recent Results (from the past 504 hour(s))   POCT glucose    Collection Time: 08/02/23 11:55 AM   Result Value Ref Range    POCT Glucose 76 70 - 110 mg/dL   Comprehensive metabolic panel    Collection Time: 08/02/23 12:22 PM   Result Value Ref Range    Sodium Level 136 135 - 145 mmol/L    Potassium Level 4.0 3.5 - 5.1 mmol/L    Chloride 96 (L) 98 - 110 mmol/L    Carbon Dioxide 26 21 - 32 mmol/L    Glucose Level 84 70 - 115 mg/dL    Blood Urea Nitrogen 23.0 (H) 7.0 - 20.0 mg/dL    Creatinine 0.77 0.66 - 1.25 mg/dL    Calcium Level Total 9.8 8.4 - 10.2 mg/dL    Protein Total 8.0 6.3 - 8.2 gm/dL    Albumin Level 5.2 (H) 3.4 - 5.0 g/dL    Globulin 2.8 2.0 - 3.9 gm/dL    Albumin/Globulin Ratio 1.9 ratio    Bilirubin Total 0.6 0.0 - 1.0 mg/dL    Alkaline Phosphatase 110 50 - 144 unit/L    Alanine Aminotransferase 99 (H) 1 - 45 unit/L    Aspartate Aminotransferase 91 (H) 14 - 36 unit/L    eGFR 90 mls/min/1.73/m2    Anion Gap 14.0 (H) 2.0 - 13.0 mEq/L    BUN/Creatinine Ratio 30 (H) 12 - 20   Protime-INR    Collection Time: 08/02/23 12:22 PM   Result Value Ref Range    PT 10.2 9.3 - 11.9 seconds    INR 1.0 <5.0   APTT    Collection Time: 08/02/23 12:22 PM   Result Value Ref Range    PTT 25.6 23.0 - 29.4 seconds   Magnesium    Collection Time: 08/02/23 12:22 PM   Result Value Ref Range    Magnesium Level 2.60 (H) 1.80 - 2.40 mg/dL   Ammonia    Collection Time: 08/02/23 12:22 PM    Result Value Ref Range    Ammonia Level 22.0 11.0 - 32.0 umol/L   CBC with Differential    Collection Time: 08/02/23 12:22 PM   Result Value Ref Range    WBC 8.51 4.00 - 11.50 x10(3)/mcL    RBC 5.33 (H) 4.00 - 5.10 x10(6)/mcL    Hgb 14.7 11.8 - 16.0 g/dL    Hct 44.9 36.0 - 48.0 %    MCV 84.2 79.0 - 99.0 fL    MCH 27.6 27.0 - 34.0 pg    MCHC 32.7 31.0 - 37.0 g/dL    RDW 14.2 11.0 - 14.5 %    Platelet 221 140 - 371 x10(3)/mcL    MPV 9.3 (L) 9.4 - 12.4 fL    Neut % 55.9 37 - 73 %    Lymph % 33.8 20 - 55 %    Mono % 5.9 4.7 - 12.5 %    Eos % 3.1 0.7 - 7 %    Basophil % 1.1 0.1 - 1.2 %    Lymph # 2.88 1.16 - 3.74 x10(3)/mcL    Neut # 4.76 1.56 - 6.13 x10(3)/mcL    Mono # 0.50 (H) 0.24 - 0.36 x10(3)/mcL    Eos # 0.26 0.04 - 0.36 x10(3)/mcL    Baso # 0.09 (H) 0.01 - 0.08 x10(3)/mcL    IG# 0.02 0.0001 - 0.031 x10(3)/mcL    IG% 0.2 0 - 0.5 %    NRBC% 0.0 <=1 %   Urinalysis    Collection Time: 08/02/23 12:50 PM   Result Value Ref Range    Color, UA Yellow Yellow, Light-Yellow, Dark Yellow, Florence, Straw    Appearance, UA Clear Clear    Specific Gravity, UA <=1.005     pH, UA 6.0 5.0 - 8.5    Protein, UA Negative Negative    Glucose,  (A) Negative, Normal    Ketones, UA Negative Negative    Blood, UA Negative Negative    Bilirubin, UA Negative Negative    Urobilinogen, UA 0.2 0.2, 1.0, Normal    Nitrites, UA Negative Negative    Leukocyte Esterase, UA Negative Negative   Drug Screen, Urine    Collection Time: 08/02/23 12:50 PM   Result Value Ref Range    Amphetamines, Urine Negative Negative    Barbituates, Urine Negative Negative    Benzodiazepine, Urine Negative Negative    Cannabinoids, Urine Negative Negative    Cocaine, Urine Negative Negative    Opiates, Urine Positive (A) Negative    Phencyclidine, Urine Negative Negative    pH, Urine 6.0 3.0 - 11.0    Methadone, Urine Negative Negative   POCT glucose    Collection Time: 08/02/23  4:13 PM   Result Value Ref Range    POCT Glucose 91 70 - 110 mg/dL        OBJECTIVE:  Vital signs  Vitals:    08/22/23 1340 08/22/23 1341   BP: (!) 166/94 (!) 160/92   BP Location: Left arm Left arm   Patient Position: Sitting Sitting   BP Method: Large (Manual) Large (Manual)   Pulse: 91    Temp: 98.4 °F (36.9 °C)    TempSrc: Temporal    SpO2: 96%         Physical Exam tenderness to palpation around the right shoulder.  She does have some range of motion of the shoulder with about 30° abduction without too much pain.  She can move the left hip but there is some soreness and same with the knee.  She has tenderness on the lateral joint line of the left knee    ASSESSMENT/PLAN:  1. Acute pain of right shoulder  X-ray here in the office today shows to me what looks like an acute slightly impacted humeral head fracture.  There is no angulation or displacement.  On keep her in the shoulder sling for the next 2-3 weeks and start moving as pain will tolerate.  -     X-ray Shoulder 2 or More Views Right; Future; Expected date: 08/22/2023  -     X-Ray Hip 2 or 3 views Left (with Pelvis when performed); Future; Expected date: 08/22/2023  -     X-Ray Knee 1 or 2 View Left; Future; Expected date: 08/22/2023    2. Left hip pain  Left hip x-rays show some degenerative changes.  The left knee show severe arthritic changes in the lateral compartment.  This is more arthritic and not related any trauma fall.  -     X-ray Shoulder 2 or More Views Right; Future; Expected date: 08/22/2023  -     X-Ray Hip 2 or 3 views Left (with Pelvis when performed); Future; Expected date: 08/22/2023  -     X-Ray Knee 1 or 2 View Left; Future; Expected date: 08/22/2023         Follow Up:  No follow-ups on file.

## 2023-08-31 DIAGNOSIS — K21.9 GASTROESOPHAGEAL REFLUX DISEASE, UNSPECIFIED WHETHER ESOPHAGITIS PRESENT: ICD-10-CM

## 2023-08-31 DIAGNOSIS — G60.0 CHARCOT-MARIE-TOOTH DISEASE: ICD-10-CM

## 2023-08-31 DIAGNOSIS — I10 BENIGN ESSENTIAL HYPERTENSION: Primary | ICD-10-CM

## 2023-08-31 RX ORDER — FAMOTIDINE 20 MG/1
TABLET, FILM COATED ORAL
Qty: 60 TABLET | Refills: 5 | Status: SHIPPED | OUTPATIENT
Start: 2023-08-31 | End: 2024-01-22

## 2023-08-31 RX ORDER — CARVEDILOL 6.25 MG/1
6.25 TABLET ORAL 2 TIMES DAILY
Qty: 180 TABLET | Refills: 1 | Status: SHIPPED | OUTPATIENT
Start: 2023-08-31 | End: 2024-03-04 | Stop reason: SDUPTHER

## 2023-08-31 RX ORDER — HYDROCODONE BITARTRATE AND ACETAMINOPHEN 10; 325 MG/1; MG/1
1 TABLET ORAL EVERY 6 HOURS PRN
Qty: 120 TABLET | Refills: 0 | Status: SHIPPED | OUTPATIENT
Start: 2023-08-31 | End: 2023-09-27 | Stop reason: SDUPTHER

## 2023-09-27 DIAGNOSIS — G60.0 CHARCOT-MARIE-TOOTH DISEASE: ICD-10-CM

## 2023-09-27 DIAGNOSIS — N18.31 TYPE 2 DIABETES MELLITUS WITH STAGE 3A CHRONIC KIDNEY DISEASE, WITHOUT LONG-TERM CURRENT USE OF INSULIN: Primary | ICD-10-CM

## 2023-09-27 DIAGNOSIS — E53.8 B12 DEFICIENCY: ICD-10-CM

## 2023-09-27 DIAGNOSIS — E11.22 TYPE 2 DIABETES MELLITUS WITH STAGE 3A CHRONIC KIDNEY DISEASE, WITHOUT LONG-TERM CURRENT USE OF INSULIN: Primary | ICD-10-CM

## 2023-09-27 DIAGNOSIS — G89.4 CHRONIC PAIN SYNDROME: ICD-10-CM

## 2023-09-27 RX ORDER — HYDROCODONE BITARTRATE AND ACETAMINOPHEN 10; 325 MG/1; MG/1
1 TABLET ORAL EVERY 6 HOURS PRN
Qty: 120 TABLET | Refills: 0 | Status: SHIPPED | OUTPATIENT
Start: 2023-09-28 | End: 2023-10-26 | Stop reason: SDUPTHER

## 2023-09-27 RX ORDER — SEMAGLUTIDE 2.68 MG/ML
2 INJECTION, SOLUTION SUBCUTANEOUS
Qty: 3 ML | Refills: 5 | Status: SHIPPED | OUTPATIENT
Start: 2023-09-27 | End: 2023-10-23 | Stop reason: SDUPTHER

## 2023-09-27 RX ORDER — CYANOCOBALAMIN 1000 UG/ML
INJECTION, SOLUTION INTRAMUSCULAR; SUBCUTANEOUS
Qty: 1 ML | Refills: 3 | Status: SHIPPED | OUTPATIENT
Start: 2023-09-27 | End: 2023-10-30 | Stop reason: SDUPTHER

## 2023-09-27 RX ORDER — TRIPROLIDINE/PSEUDOEPHEDRINE 2.5MG-60MG
TABLET ORAL
Qty: 900 ML | Refills: 1 | Status: SHIPPED | OUTPATIENT
Start: 2023-09-27 | End: 2023-11-28 | Stop reason: SDUPTHER

## 2023-10-12 ENCOUNTER — TELEPHONE (OUTPATIENT)
Dept: FAMILY MEDICINE | Facility: CLINIC | Age: 58
End: 2023-10-12
Payer: MEDICARE

## 2023-10-20 ENCOUNTER — PATIENT MESSAGE (OUTPATIENT)
Dept: ADMINISTRATIVE | Facility: HOSPITAL | Age: 58
End: 2023-10-20
Payer: MEDICARE

## 2023-10-23 DIAGNOSIS — E78.2 MIXED HYPERLIPIDEMIA: ICD-10-CM

## 2023-10-23 DIAGNOSIS — E11.22 TYPE 2 DIABETES MELLITUS WITH STAGE 3A CHRONIC KIDNEY DISEASE, WITHOUT LONG-TERM CURRENT USE OF INSULIN: ICD-10-CM

## 2023-10-23 DIAGNOSIS — N18.31 TYPE 2 DIABETES MELLITUS WITH STAGE 3A CHRONIC KIDNEY DISEASE, WITHOUT LONG-TERM CURRENT USE OF INSULIN: ICD-10-CM

## 2023-10-23 RX ORDER — ROSUVASTATIN CALCIUM 10 MG/1
10 TABLET, COATED ORAL NIGHTLY
Qty: 90 TABLET | Refills: 1 | Status: SHIPPED | OUTPATIENT
Start: 2023-10-23 | End: 2024-04-20

## 2023-10-23 RX ORDER — SEMAGLUTIDE 2.68 MG/ML
2 INJECTION, SOLUTION SUBCUTANEOUS
Qty: 9 ML | Refills: 1 | Status: SHIPPED | OUTPATIENT
Start: 2023-10-23 | End: 2024-02-14 | Stop reason: ALTCHOICE

## 2023-10-26 DIAGNOSIS — G60.0 CHARCOT-MARIE-TOOTH DISEASE: ICD-10-CM

## 2023-10-26 DIAGNOSIS — G89.4 CHRONIC PAIN SYNDROME: ICD-10-CM

## 2023-10-26 RX ORDER — AMITRIPTYLINE HYDROCHLORIDE 150 MG/1
150 TABLET ORAL NIGHTLY
Qty: 30 TABLET | Refills: 5 | Status: SHIPPED | OUTPATIENT
Start: 2023-10-26 | End: 2024-01-11

## 2023-10-26 RX ORDER — HYDROCODONE BITARTRATE AND ACETAMINOPHEN 10; 325 MG/1; MG/1
1 TABLET ORAL EVERY 6 HOURS PRN
Qty: 120 TABLET | Refills: 0 | Status: SHIPPED | OUTPATIENT
Start: 2023-10-26 | End: 2023-11-28 | Stop reason: SDUPTHER

## 2023-10-30 DIAGNOSIS — E53.8 B12 DEFICIENCY: ICD-10-CM

## 2023-10-30 RX ORDER — CYANOCOBALAMIN 1000 UG/ML
1000 INJECTION, SOLUTION INTRAMUSCULAR; SUBCUTANEOUS
Qty: 1 ML | Refills: 3 | Status: SHIPPED | OUTPATIENT
Start: 2023-10-30 | End: 2024-03-06 | Stop reason: SDUPTHER

## 2023-11-27 DIAGNOSIS — F32.9 MAJOR DEPRESSIVE DISORDER WITH CURRENT ACTIVE EPISODE, UNSPECIFIED DEPRESSION EPISODE SEVERITY, UNSPECIFIED WHETHER RECURRENT: ICD-10-CM

## 2023-11-27 RX ORDER — CLONAZEPAM 1 MG/1
TABLET ORAL
Qty: 60 TABLET | Refills: 3 | Status: SHIPPED | OUTPATIENT
Start: 2023-11-27 | End: 2024-03-25 | Stop reason: SDUPTHER

## 2023-11-28 DIAGNOSIS — G89.4 CHRONIC PAIN SYNDROME: ICD-10-CM

## 2023-11-28 DIAGNOSIS — G60.0 CHARCOT-MARIE-TOOTH DISEASE: ICD-10-CM

## 2023-11-28 RX ORDER — TRIPROLIDINE/PSEUDOEPHEDRINE 2.5MG-60MG
TABLET ORAL
Qty: 900 ML | Refills: 1 | Status: SHIPPED | OUTPATIENT
Start: 2023-11-28 | End: 2024-01-11 | Stop reason: SDUPTHER

## 2023-11-28 RX ORDER — HYDROCODONE BITARTRATE AND ACETAMINOPHEN 10; 325 MG/1; MG/1
1 TABLET ORAL EVERY 6 HOURS PRN
Qty: 120 TABLET | Refills: 0 | Status: SHIPPED | OUTPATIENT
Start: 2023-11-28 | End: 2023-12-27

## 2023-12-26 DIAGNOSIS — G60.0 CHARCOT-MARIE-TOOTH DISEASE: ICD-10-CM

## 2023-12-27 ENCOUNTER — TELEPHONE (OUTPATIENT)
Dept: FAMILY MEDICINE | Facility: CLINIC | Age: 58
End: 2023-12-27
Payer: MEDICARE

## 2023-12-27 DIAGNOSIS — I10 BENIGN ESSENTIAL HYPERTENSION: ICD-10-CM

## 2023-12-27 RX ORDER — HYDROCODONE BITARTRATE AND ACETAMINOPHEN 10; 325 MG/1; MG/1
TABLET ORAL
Qty: 120 TABLET | Refills: 0 | Status: SHIPPED | OUTPATIENT
Start: 2023-12-27 | End: 2024-01-25 | Stop reason: SDUPTHER

## 2023-12-27 RX ORDER — ISOSORBIDE MONONITRATE 30 MG/1
30 TABLET, EXTENDED RELEASE ORAL DAILY
Qty: 30 TABLET | Refills: 3 | Status: SHIPPED | OUTPATIENT
Start: 2023-12-27 | End: 2024-04-25

## 2023-12-28 ENCOUNTER — TELEPHONE (OUTPATIENT)
Dept: FAMILY MEDICINE | Facility: CLINIC | Age: 58
End: 2023-12-28
Payer: MEDICARE

## 2023-12-28 DIAGNOSIS — J43.9 PULMONARY EMPHYSEMA, UNSPECIFIED EMPHYSEMA TYPE: ICD-10-CM

## 2023-12-28 RX ORDER — ALBUTEROL SULFATE 90 UG/1
2 AEROSOL, METERED RESPIRATORY (INHALATION) 4 TIMES DAILY
Qty: 6.7 G | Refills: 3 | Status: SHIPPED | OUTPATIENT
Start: 2023-12-28

## 2024-01-04 ENCOUNTER — TELEPHONE (OUTPATIENT)
Dept: FAMILY MEDICINE | Facility: CLINIC | Age: 59
End: 2024-01-04
Payer: MEDICARE

## 2024-01-09 ENCOUNTER — LAB REQUISITION (OUTPATIENT)
Dept: LAB | Facility: HOSPITAL | Age: 59
End: 2024-01-09
Payer: MEDICARE

## 2024-01-09 DIAGNOSIS — N18.30 CHRONIC KIDNEY DISEASE, STAGE 3 UNSPECIFIED: ICD-10-CM

## 2024-01-09 DIAGNOSIS — I25.119 ATHEROSCLEROTIC HEART DISEASE OF NATIVE CORONARY ARTERY WITH UNSPECIFIED ANGINA PECTORIS: ICD-10-CM

## 2024-01-09 DIAGNOSIS — E11.40 TYPE 2 DIABETES MELLITUS WITH DIABETIC NEUROPATHY, UNSPECIFIED: ICD-10-CM

## 2024-01-09 DIAGNOSIS — I50.32 CHRONIC DIASTOLIC (CONGESTIVE) HEART FAILURE: ICD-10-CM

## 2024-01-09 DIAGNOSIS — E66.01 MORBID (SEVERE) OBESITY DUE TO EXCESS CALORIES: ICD-10-CM

## 2024-01-09 DIAGNOSIS — I11.0 HYPERTENSIVE HEART DISEASE WITH HEART FAILURE: ICD-10-CM

## 2024-01-09 DIAGNOSIS — E11.22 TYPE 2 DIABETES MELLITUS WITH DIABETIC CHRONIC KIDNEY DISEASE: ICD-10-CM

## 2024-01-09 DIAGNOSIS — Z79.85 LONG-TERM (CURRENT) USE OF INJECTABLE NON-INSULIN ANTIDIABETIC DRUGS: ICD-10-CM

## 2024-01-09 LAB
ALBUMIN SERPL-MCNC: 4.6 G/DL (ref 3.4–5)
ALBUMIN/GLOB SERPL: 1.7 RATIO
ALP SERPL-CCNC: 83 UNIT/L (ref 50–144)
ALT SERPL-CCNC: 85 UNIT/L (ref 1–45)
ANION GAP SERPL CALC-SCNC: 8 MEQ/L (ref 2–13)
AST SERPL-CCNC: 74 UNIT/L (ref 14–36)
BASOPHILS # BLD AUTO: 0.07 X10(3)/MCL (ref 0.01–0.08)
BASOPHILS NFR BLD AUTO: 0.9 % (ref 0.1–1.2)
BILIRUB SERPL-MCNC: 0.4 MG/DL (ref 0–1)
BUN SERPL-MCNC: 19 MG/DL (ref 7–20)
CALCIUM SERPL-MCNC: 8.7 MG/DL (ref 8.4–10.2)
CHLORIDE SERPL-SCNC: 100 MMOL/L (ref 98–110)
CHOLEST SERPL-MCNC: 99 MG/DL (ref 0–200)
CO2 SERPL-SCNC: 32 MMOL/L (ref 21–32)
CREAT SERPL-MCNC: 0.95 MG/DL (ref 0.66–1.25)
CREAT/UREA NIT SERPL: 20 (ref 12–20)
EOSINOPHIL # BLD AUTO: 0.24 X10(3)/MCL (ref 0.04–0.36)
EOSINOPHIL NFR BLD AUTO: 3.2 % (ref 0.7–7)
ERYTHROCYTE [DISTWIDTH] IN BLOOD BY AUTOMATED COUNT: 13.7 % (ref 11–14.5)
EST. AVERAGE GLUCOSE BLD GHB EST-MCNC: 131.2 MG/DL (ref 70–115)
GFR SERPLBLD CREATININE-BSD FMLA CKD-EPI: 69 MLS/MIN/1.73/M2
GLOBULIN SER-MCNC: 2.7 GM/DL (ref 2–3.9)
GLUCOSE SERPL-MCNC: 102 MG/DL (ref 70–115)
HBA1C MFR BLD: 6.2 % (ref 4–6)
HCT VFR BLD AUTO: 40.2 % (ref 36–48)
HCV AB SERPL QL IA: NONREACTIVE
HDLC SERPL-MCNC: 37 MG/DL (ref 40–60)
HGB BLD-MCNC: 12.8 G/DL (ref 11.8–16)
IMM GRANULOCYTES # BLD AUTO: 0.01 X10(3)/MCL (ref 0–0.03)
IMM GRANULOCYTES NFR BLD AUTO: 0.1 % (ref 0–0.5)
LDLC SERPL DIRECT ASSAY-SCNC: 47.2 MG/DL (ref 30–100)
LYMPHOCYTES # BLD AUTO: 2.09 X10(3)/MCL (ref 1.16–3.74)
LYMPHOCYTES NFR BLD AUTO: 27.7 % (ref 20–55)
MCH RBC QN AUTO: 27.6 PG (ref 27–34)
MCHC RBC AUTO-ENTMCNC: 31.8 G/DL (ref 31–37)
MCV RBC AUTO: 86.8 FL (ref 79–99)
MONOCYTES # BLD AUTO: 0.44 X10(3)/MCL (ref 0.24–0.36)
MONOCYTES NFR BLD AUTO: 5.8 % (ref 4.7–12.5)
NEUTROPHILS # BLD AUTO: 4.7 X10(3)/MCL (ref 1.56–6.13)
NEUTROPHILS NFR BLD AUTO: 62.3 % (ref 37–73)
NRBC BLD AUTO-RTO: 0 %
PLATELET # BLD AUTO: 219 X10(3)/MCL (ref 140–371)
PMV BLD AUTO: 9.8 FL (ref 9.4–12.4)
POTASSIUM SERPL-SCNC: 4.2 MMOL/L (ref 3.5–5.1)
PROT SERPL-MCNC: 7.3 GM/DL (ref 6.3–8.2)
RBC # BLD AUTO: 4.63 X10(6)/MCL (ref 4–5.1)
SODIUM SERPL-SCNC: 140 MMOL/L (ref 135–145)
TRIGL SERPL-MCNC: 163 MG/DL (ref 30–200)
TSH SERPL-ACNC: 2.4 UIU/ML (ref 0.36–3.74)
VIT B12 SERPL-MCNC: 882 PG/ML (ref 211–946)
WBC # SPEC AUTO: 7.55 X10(3)/MCL (ref 4–11.5)

## 2024-01-09 PROCEDURE — 83921 ORGANIC ACID SINGLE QUANT: CPT | Performed by: FAMILY MEDICINE

## 2024-01-09 PROCEDURE — 85025 COMPLETE CBC W/AUTO DIFF WBC: CPT | Performed by: FAMILY MEDICINE

## 2024-01-09 PROCEDURE — 83036 HEMOGLOBIN GLYCOSYLATED A1C: CPT | Performed by: FAMILY MEDICINE

## 2024-01-09 PROCEDURE — 80061 LIPID PANEL: CPT | Performed by: FAMILY MEDICINE

## 2024-01-09 PROCEDURE — 82607 VITAMIN B-12: CPT | Performed by: FAMILY MEDICINE

## 2024-01-09 PROCEDURE — 86803 HEPATITIS C AB TEST: CPT | Performed by: FAMILY MEDICINE

## 2024-01-09 PROCEDURE — 84443 ASSAY THYROID STIM HORMONE: CPT | Performed by: FAMILY MEDICINE

## 2024-01-09 PROCEDURE — 80053 COMPREHEN METABOLIC PANEL: CPT | Performed by: FAMILY MEDICINE

## 2024-01-11 ENCOUNTER — OFFICE VISIT (OUTPATIENT)
Dept: FAMILY MEDICINE | Facility: CLINIC | Age: 59
End: 2024-01-11
Payer: MEDICARE

## 2024-01-11 DIAGNOSIS — N18.30 STAGE 3 CHRONIC KIDNEY DISEASE, UNSPECIFIED WHETHER STAGE 3A OR 3B CKD: ICD-10-CM

## 2024-01-11 DIAGNOSIS — F32.9 MAJOR DEPRESSIVE DISORDER WITH CURRENT ACTIVE EPISODE, UNSPECIFIED DEPRESSION EPISODE SEVERITY, UNSPECIFIED WHETHER RECURRENT: ICD-10-CM

## 2024-01-11 DIAGNOSIS — K21.9 GASTROESOPHAGEAL REFLUX DISEASE, UNSPECIFIED WHETHER ESOPHAGITIS PRESENT: ICD-10-CM

## 2024-01-11 DIAGNOSIS — N18.31 TYPE 2 DIABETES MELLITUS WITH STAGE 3A CHRONIC KIDNEY DISEASE, WITHOUT LONG-TERM CURRENT USE OF INSULIN: ICD-10-CM

## 2024-01-11 DIAGNOSIS — I20.9 ANGINA PECTORIS: ICD-10-CM

## 2024-01-11 DIAGNOSIS — E11.40 TYPE 2 DIABETES MELLITUS WITH DIABETIC NEUROPATHY, WITHOUT LONG-TERM CURRENT USE OF INSULIN: ICD-10-CM

## 2024-01-11 DIAGNOSIS — G89.4 CHRONIC PAIN SYNDROME: ICD-10-CM

## 2024-01-11 DIAGNOSIS — I50.32 CHRONIC HEART FAILURE WITH PRESERVED EJECTION FRACTION: ICD-10-CM

## 2024-01-11 DIAGNOSIS — E11.22 TYPE 2 DIABETES MELLITUS WITH STAGE 3A CHRONIC KIDNEY DISEASE, WITHOUT LONG-TERM CURRENT USE OF INSULIN: ICD-10-CM

## 2024-01-11 DIAGNOSIS — J43.9 PULMONARY EMPHYSEMA, UNSPECIFIED EMPHYSEMA TYPE: ICD-10-CM

## 2024-01-11 DIAGNOSIS — G60.0 HEREDITARY SENSORIMOTOR NEUROPATHY: Primary | ICD-10-CM

## 2024-01-11 DIAGNOSIS — E66.01 CLASS 3 SEVERE OBESITY DUE TO EXCESS CALORIES WITH SERIOUS COMORBIDITY AND BODY MASS INDEX (BMI) OF 50.0 TO 59.9 IN ADULT: ICD-10-CM

## 2024-01-11 PROCEDURE — 99214 OFFICE O/P EST MOD 30 MIN: CPT | Mod: 95,,, | Performed by: FAMILY MEDICINE

## 2024-01-11 RX ORDER — AMITRIPTYLINE HYDROCHLORIDE 75 MG/1
75 TABLET ORAL NIGHTLY
Qty: 30 TABLET | Refills: 0 | Status: SHIPPED | OUTPATIENT
Start: 2024-01-11 | End: 2024-02-10

## 2024-01-11 RX ORDER — LURASIDONE HYDROCHLORIDE 20 MG/1
TABLET, FILM COATED ORAL
Qty: 63 TABLET | Refills: 0 | Status: SHIPPED | OUTPATIENT
Start: 2024-01-11 | End: 2024-02-15

## 2024-01-11 RX ORDER — TORSEMIDE 20 MG/1
40 TABLET ORAL 2 TIMES DAILY
Qty: 120 TABLET | Refills: 5 | Status: SHIPPED | OUTPATIENT
Start: 2024-01-11 | End: 2024-07-09

## 2024-01-11 RX ORDER — TRIPROLIDINE/PSEUDOEPHEDRINE 2.5MG-60MG
TABLET ORAL
Qty: 900 ML | Refills: 1 | Status: SHIPPED | OUTPATIENT
Start: 2024-01-11

## 2024-01-11 RX ORDER — POTASSIUM CHLORIDE 20 MEQ/1
TABLET, EXTENDED RELEASE ORAL
Qty: 90 TABLET | Refills: 3 | Status: SHIPPED | OUTPATIENT
Start: 2024-01-11

## 2024-01-11 RX ORDER — PANTOPRAZOLE SODIUM 40 MG/1
40 TABLET, DELAYED RELEASE ORAL DAILY
Qty: 90 TABLET | Refills: 1 | Status: SHIPPED | OUTPATIENT
Start: 2024-01-11 | End: 2024-07-09

## 2024-01-11 RX ORDER — METFORMIN HYDROCHLORIDE 1000 MG/1
1000 TABLET ORAL 2 TIMES DAILY
Qty: 180 TABLET | Refills: 1 | Status: SHIPPED | OUTPATIENT
Start: 2024-01-11 | End: 2024-07-09

## 2024-01-11 NOTE — PROGRESS NOTES
SUBJECTIVE:  Daya Timmons is a 59 y.o. female here for No chief complaint on file.      HPI  Patient being seen as a TeleMed visit today.  She is here for follow-up on chronic conditions.  She has been having some worsening of her mood and depression.  She has been very paranoid and not wanting to get out of the house.  She is always feeling suspicious.  Daya's allergies, medications, history, and problem list were updated as appropriate.    Review of Systems   See HPI    Recent Results (from the past 504 hour(s))   Hemoglobin A1C    Collection Time: 01/09/24  9:25 AM   Result Value Ref Range    Hemoglobin A1c 6.2 (H) 4.0 - 6.0 %    Estimated Average Glucose 131.2 (H) 70.0 - 115.0 mg/dL   Comprehensive Metabolic Panel    Collection Time: 01/09/24  9:25 AM   Result Value Ref Range    Sodium Level 140 135 - 145 mmol/L    Potassium Level 4.2 3.5 - 5.1 mmol/L    Chloride 100 98 - 110 mmol/L    Carbon Dioxide 32 21 - 32 mmol/L    Glucose Level 102 70 - 115 mg/dL    Blood Urea Nitrogen 19.0 7.0 - 20.0 mg/dL    Creatinine 0.95 0.66 - 1.25 mg/dL    Calcium Level Total 8.7 8.4 - 10.2 mg/dL    Protein Total 7.3 6.3 - 8.2 gm/dL    Albumin Level 4.6 3.4 - 5.0 g/dL    Globulin 2.7 2.0 - 3.9 gm/dL    Albumin/Globulin Ratio 1.7 ratio    Bilirubin Total 0.4 0.0 - 1.0 mg/dL    Alkaline Phosphatase 83 50 - 144 unit/L    Alanine Aminotransferase 85 (H) 1 - 45 unit/L    Aspartate Aminotransferase 74 (H) 14 - 36 unit/L    eGFR 69 mls/min/1.73/m2    Anion Gap 8.0 2.0 - 13.0 mEq/L    BUN/Creatinine Ratio 20 12 - 20   Lipid Panel    Collection Time: 01/09/24  9:25 AM   Result Value Ref Range    Cholesterol Total 99 0 - 200 mg/dL    HDL Cholesterol 37 (L) 40 - 60 mg/dL    Triglyceride 163 30 - 200 mg/dL    LDL Cholesterol Direct 47.2 30.0 - 100.0 mg/dL   TSH    Collection Time: 01/09/24  9:25 AM   Result Value Ref Range    TSH 2.400 0.360 - 3.740 uIU/mL   Vitamin B12    Collection Time: 01/09/24  9:25 AM   Result Value Ref Range    Vitamin  B12 Level 882 211 - 946 pg/mL   Hepatitis C Antibody    Collection Time: 01/09/24  9:25 AM   Result Value Ref Range    Hep C Ab Interp Nonreactive Nonreactive   CBC with Differential    Collection Time: 01/09/24  9:25 AM   Result Value Ref Range    WBC 7.55 4.00 - 11.50 x10(3)/mcL    RBC 4.63 4.00 - 5.10 x10(6)/mcL    Hgb 12.8 11.8 - 16.0 g/dL    Hct 40.2 36.0 - 48.0 %    MCV 86.8 79.0 - 99.0 fL    MCH 27.6 27.0 - 34.0 pg    MCHC 31.8 31.0 - 37.0 g/dL    RDW 13.7 11.0 - 14.5 %    Platelet 219 140 - 371 x10(3)/mcL    MPV 9.8 9.4 - 12.4 fL    Neut % 62.3 37 - 73 %    Lymph % 27.7 20 - 55 %    Mono % 5.8 4.7 - 12.5 %    Eos % 3.2 0.7 - 7 %    Basophil % 0.9 0.1 - 1.2 %    Lymph # 2.09 1.16 - 3.74 x10(3)/mcL    Neut # 4.70 1.56 - 6.13 x10(3)/mcL    Mono # 0.44 (H) 0.24 - 0.36 x10(3)/mcL    Eos # 0.24 0.04 - 0.36 x10(3)/mcL    Baso # 0.07 0.01 - 0.08 x10(3)/mcL    IG# 0.01 0.0001 - 0.031 x10(3)/mcL    IG% 0.1 0 - 0.5 %    NRBC% 0.0 <=1 %       OBJECTIVE:  Vital signs  There were no vitals filed for this visit.     Physical Exam this is a TeleMed visit.  Patient appeared to be in no acute distress.  She would normal affect    ASSESSMENT/PLAN:  1. Hereditary sensorimotor neuropathy  Stable.  She is on gabapentin    2. Chronic pain syndrome  Amitriptyline and hydrocodone regularly.   report reviewed  -     amitriptyline (ELAVIL) 75 MG tablet; Take 1 tablet (75 mg total) by mouth every evening.  Dispense: 30 tablet; Refill: 0  -     ibuprofen 20 mg/mL oral liquid; TAKE 2 TABLESPOONFULS (30 ML) EVERY 6 HOURS AS NEEDED -- SHAKE WELL BEFORE USE  Dispense: 900 mL; Refill: 1    3. Type 2 diabetes mellitus with stage 3a chronic kidney disease, without long-term current use of insulin  A1c is doing very well.  Currently on Ozempic, metformin, Farxiga  -     metFORMIN (GLUCOPHAGE) 1000 MG tablet; Take 1 tablet (1,000 mg total) by mouth 2 (two) times a day.  Dispense: 180 tablet; Refill: 1    4. Gastroesophageal reflux disease,  unspecified whether esophagitis present  Stable  -     pantoprazole (PROTONIX) 40 MG tablet; Take 1 tablet (40 mg total) by mouth once daily.  Dispense: 90 tablet; Refill: 1    5. Angina pectoris  No recent chest pain  6. Chronic heart failure with preserved ejection fraction  Stable, on carvedilol and losartan  -     torsemide (DEMADEX) 20 MG Tab; Take 2 tablets (40 mg total) by mouth 2 (two) times a day.  Dispense: 120 tablet; Refill: 5    7. Major depressive disorder with current active episode, unspecified depression episode severity, unspecified whether recurrent  Continue duloxetine.  We will add Latuda 20 mg daily for a week and then increase to 40 mg daily.  decrease amitriptyline to 75 mg daily    8. Class 3 severe obesity due to excess calories with serious comorbidity and body mass index (BMI) of 50.0 to 59.9 in adult      9. Pulmonary emphysema, unspecified emphysema type  Stable on inhalers    10. Type 2 diabetes mellitus with diabetic neuropathy, without long-term current use of insulin    Overview:  Lab Results   Component Value Date    HGBA1C 6.2 (H) 01/09/2024    HGBA1C 6.1 (H) 07/27/2023    HGBA1C 6.3 (H) 04/26/2023         Assessment & Plan:  On ozempic, farxiga and metformin      11. Stage 3 chronic kidney disease, unspecified whether stage 3a or 3b CKD  Stable  Overview:  Lab Results   Component Value Date    CREATININE 0.95 01/09/2024         Other orders  -     lurasidone (LATUDA) 20 mg Tab tablet; Take 1 tablet (20 mg total) by mouth once daily for 7 days, THEN 2 tablets (40 mg total) once daily.  Dispense: 63 tablet; Refill: 0  -     dapagliflozin propanediol (FARXIGA) 10 mg tablet; Take 1 tablet (10 mg total) by mouth once daily.  Dispense: 90 tablet; Refill: 1         Follow Up:  Follow up in about 2 weeks (around 1/25/2024).

## 2024-01-12 LAB — METHYLMALONATE SERPL-SCNC: 0.14 NMOL/ML

## 2024-01-16 ENCOUNTER — TELEPHONE (OUTPATIENT)
Dept: FAMILY MEDICINE | Facility: CLINIC | Age: 59
End: 2024-01-16
Payer: MEDICARE

## 2024-01-17 ENCOUNTER — TELEPHONE (OUTPATIENT)
Dept: FAMILY MEDICINE | Facility: CLINIC | Age: 59
End: 2024-01-17
Payer: MEDICARE

## 2024-01-22 DIAGNOSIS — K21.9 GASTROESOPHAGEAL REFLUX DISEASE, UNSPECIFIED WHETHER ESOPHAGITIS PRESENT: ICD-10-CM

## 2024-01-22 DIAGNOSIS — J31.0 CHRONIC RHINITIS: ICD-10-CM

## 2024-01-22 RX ORDER — FLUTICASONE PROPIONATE 50 MCG
2 SPRAY, SUSPENSION (ML) NASAL
Qty: 48 G | Refills: 0 | Status: SHIPPED | OUTPATIENT
Start: 2024-01-22

## 2024-01-22 RX ORDER — GABAPENTIN 600 MG/1
TABLET ORAL
Qty: 270 TABLET | Refills: 0 | Status: SHIPPED | OUTPATIENT
Start: 2024-01-22 | End: 2024-05-20

## 2024-01-22 RX ORDER — FAMOTIDINE 20 MG/1
TABLET, FILM COATED ORAL
Qty: 180 TABLET | Refills: 0 | Status: SHIPPED | OUTPATIENT
Start: 2024-01-22 | End: 2024-03-04 | Stop reason: SDUPTHER

## 2024-01-24 ENCOUNTER — OFFICE VISIT (OUTPATIENT)
Dept: FAMILY MEDICINE | Facility: CLINIC | Age: 59
End: 2024-01-24
Payer: MEDICARE

## 2024-01-24 ENCOUNTER — TELEPHONE (OUTPATIENT)
Dept: FAMILY MEDICINE | Facility: CLINIC | Age: 59
End: 2024-01-24

## 2024-01-24 ENCOUNTER — PATIENT MESSAGE (OUTPATIENT)
Dept: FAMILY MEDICINE | Facility: CLINIC | Age: 59
End: 2024-01-24

## 2024-01-24 DIAGNOSIS — F32.9 MAJOR DEPRESSIVE DISORDER WITH CURRENT ACTIVE EPISODE, UNSPECIFIED DEPRESSION EPISODE SEVERITY, UNSPECIFIED WHETHER RECURRENT: Primary | ICD-10-CM

## 2024-01-24 DIAGNOSIS — L02.91 ABSCESS: ICD-10-CM

## 2024-01-24 DIAGNOSIS — E66.01 CLASS 3 SEVERE OBESITY DUE TO EXCESS CALORIES WITH SERIOUS COMORBIDITY AND BODY MASS INDEX (BMI) OF 50.0 TO 59.9 IN ADULT: ICD-10-CM

## 2024-01-24 PROCEDURE — 99214 OFFICE O/P EST MOD 30 MIN: CPT | Mod: 95,,, | Performed by: FAMILY MEDICINE

## 2024-01-24 RX ORDER — SULFAMETHOXAZOLE AND TRIMETHOPRIM 800; 160 MG/1; MG/1
1 TABLET ORAL 2 TIMES DAILY
Qty: 14 TABLET | Refills: 0 | Status: SHIPPED | OUTPATIENT
Start: 2024-01-24 | End: 2024-01-31

## 2024-01-24 NOTE — TELEPHONE ENCOUNTER
Notified pt Dr. Rodriguez said ok to take Trulicity 3 mg until she can get Ozempic. Also notified Shama at [facility] of such orders. Pt verbalized understanding

## 2024-01-24 NOTE — PROGRESS NOTES
SUBJECTIVE:  Daya Timmons is a 59 y.o. female here for No chief complaint on file.      HPI  Patient being seen as a TeleMed visit today for follow-up on mood disorder.  Since starting Latuda she is feeling much better.  Her paranoia is much improved.  She increase to 40 mg daily.  She did find she had to stay on the amitriptyline as when she cut the dose in half she was not feeling well.  She is back on 100 mg of amitriptyline now.  She is also requesting counseling.  She also is concerned she developed a skin abscess in her right axilla.  Daya's allergies, medications, history, and problem list were updated as appropriate.    Review of Systems   See HPI.    Recent Results (from the past 504 hour(s))   Hemoglobin A1C    Collection Time: 01/09/24  9:25 AM   Result Value Ref Range    Hemoglobin A1c 6.2 (H) 4.0 - 6.0 %    Estimated Average Glucose 131.2 (H) 70.0 - 115.0 mg/dL   Comprehensive Metabolic Panel    Collection Time: 01/09/24  9:25 AM   Result Value Ref Range    Sodium Level 140 135 - 145 mmol/L    Potassium Level 4.2 3.5 - 5.1 mmol/L    Chloride 100 98 - 110 mmol/L    Carbon Dioxide 32 21 - 32 mmol/L    Glucose Level 102 70 - 115 mg/dL    Blood Urea Nitrogen 19.0 7.0 - 20.0 mg/dL    Creatinine 0.95 0.66 - 1.25 mg/dL    Calcium Level Total 8.7 8.4 - 10.2 mg/dL    Protein Total 7.3 6.3 - 8.2 gm/dL    Albumin Level 4.6 3.4 - 5.0 g/dL    Globulin 2.7 2.0 - 3.9 gm/dL    Albumin/Globulin Ratio 1.7 ratio    Bilirubin Total 0.4 0.0 - 1.0 mg/dL    Alkaline Phosphatase 83 50 - 144 unit/L    Alanine Aminotransferase 85 (H) 1 - 45 unit/L    Aspartate Aminotransferase 74 (H) 14 - 36 unit/L    eGFR 69 mls/min/1.73/m2    Anion Gap 8.0 2.0 - 13.0 mEq/L    BUN/Creatinine Ratio 20 12 - 20   Lipid Panel    Collection Time: 01/09/24  9:25 AM   Result Value Ref Range    Cholesterol Total 99 0 - 200 mg/dL    HDL Cholesterol 37 (L) 40 - 60 mg/dL    Triglyceride 163 30 - 200 mg/dL    LDL Cholesterol Direct 47.2 30.0 - 100.0 mg/dL    TSH    Collection Time: 01/09/24  9:25 AM   Result Value Ref Range    TSH 2.400 0.360 - 3.740 uIU/mL   Methylmalonic Acid, Serum    Collection Time: 01/09/24  9:25 AM   Result Value Ref Range    Methylmalonic Acid, QN 0.14 <=0.40 nmol/mL   Vitamin B12    Collection Time: 01/09/24  9:25 AM   Result Value Ref Range    Vitamin B12 Level 882 211 - 946 pg/mL   Hepatitis C Antibody    Collection Time: 01/09/24  9:25 AM   Result Value Ref Range    Hep C Ab Interp Nonreactive Nonreactive   CBC with Differential    Collection Time: 01/09/24  9:25 AM   Result Value Ref Range    WBC 7.55 4.00 - 11.50 x10(3)/mcL    RBC 4.63 4.00 - 5.10 x10(6)/mcL    Hgb 12.8 11.8 - 16.0 g/dL    Hct 40.2 36.0 - 48.0 %    MCV 86.8 79.0 - 99.0 fL    MCH 27.6 27.0 - 34.0 pg    MCHC 31.8 31.0 - 37.0 g/dL    RDW 13.7 11.0 - 14.5 %    Platelet 219 140 - 371 x10(3)/mcL    MPV 9.8 9.4 - 12.4 fL    Neut % 62.3 37 - 73 %    Lymph % 27.7 20 - 55 %    Mono % 5.8 4.7 - 12.5 %    Eos % 3.2 0.7 - 7 %    Basophil % 0.9 0.1 - 1.2 %    Lymph # 2.09 1.16 - 3.74 x10(3)/mcL    Neut # 4.70 1.56 - 6.13 x10(3)/mcL    Mono # 0.44 (H) 0.24 - 0.36 x10(3)/mcL    Eos # 0.24 0.04 - 0.36 x10(3)/mcL    Baso # 0.07 0.01 - 0.08 x10(3)/mcL    IG# 0.01 0.0001 - 0.031 x10(3)/mcL    IG% 0.1 0 - 0.5 %    NRBC% 0.0 <=1 %       OBJECTIVE:  Vital signs  There were no vitals filed for this visit.     Physical Exam patient being seen as a TeleMed visit.  Normal affect and in no distress    ASSESSMENT/PLAN:  1. Major depressive disorder with current active episode, unspecified depression episode severity, unspecified whether recurrent  Continue duloxetine along with amitriptyline 100 mg daily and Latuda 40 mg a day    2. Class 3 severe obesity due to excess calories with serious comorbidity and body mass index (BMI) of 50.0 to 59.9 in adult  She is very concerned as she can not control her diet and appetite.  We will try to get her counseling to help with this    3. Abscess  Bactrim ds  twice a day for 7 days    Other orders  -     sulfamethoxazole-trimethoprim 800-160mg (BACTRIM DS) 800-160 mg Tab; Take 1 tablet by mouth 2 (two) times daily. for 7 days  Dispense: 14 tablet; Refill: 0         Follow Up:  Follow up in about 1 month (around 2/24/2024).  This was a TeleMed visit that took place in the patient's home in [facility] between 10:00 a.m. and 10:15 a.m.

## 2024-01-25 DIAGNOSIS — G60.0 CHARCOT-MARIE-TOOTH DISEASE: ICD-10-CM

## 2024-01-25 RX ORDER — HYDROCODONE BITARTRATE AND ACETAMINOPHEN 10; 325 MG/1; MG/1
TABLET ORAL
Qty: 120 TABLET | Refills: 0 | Status: SHIPPED | OUTPATIENT
Start: 2024-01-26 | End: 2024-02-27 | Stop reason: SDUPTHER

## 2024-01-29 ENCOUNTER — TELEPHONE (OUTPATIENT)
Dept: FAMILY MEDICINE | Facility: CLINIC | Age: 59
End: 2024-01-29
Payer: MEDICARE

## 2024-01-29 NOTE — TELEPHONE ENCOUNTER
Pt didn't need new script just needed to get what was owed from pharmacy since they did not have entire script available

## 2024-02-14 ENCOUNTER — TELEPHONE (OUTPATIENT)
Dept: FAMILY MEDICINE | Facility: CLINIC | Age: 59
End: 2024-02-14
Payer: MEDICARE

## 2024-02-14 DIAGNOSIS — N18.31 TYPE 2 DIABETES MELLITUS WITH STAGE 3A CHRONIC KIDNEY DISEASE, WITHOUT LONG-TERM CURRENT USE OF INSULIN: Primary | ICD-10-CM

## 2024-02-14 DIAGNOSIS — F32.9 MAJOR DEPRESSIVE DISORDER WITH CURRENT ACTIVE EPISODE, UNSPECIFIED DEPRESSION EPISODE SEVERITY, UNSPECIFIED WHETHER RECURRENT: ICD-10-CM

## 2024-02-14 DIAGNOSIS — E11.22 TYPE 2 DIABETES MELLITUS WITH STAGE 3A CHRONIC KIDNEY DISEASE, WITHOUT LONG-TERM CURRENT USE OF INSULIN: Primary | ICD-10-CM

## 2024-02-14 RX ORDER — DULAGLUTIDE 3 MG/.5ML
3 INJECTION, SOLUTION SUBCUTANEOUS
Qty: 4 PEN | Refills: 2 | Status: SHIPPED | OUTPATIENT
Start: 2024-02-14 | End: 2024-03-12 | Stop reason: SDUPTHER

## 2024-02-14 RX ORDER — DULOXETIN HYDROCHLORIDE 60 MG/1
CAPSULE, DELAYED RELEASE ORAL
Qty: 30 CAPSULE | Refills: 3 | Status: SHIPPED | OUTPATIENT
Start: 2024-02-14 | End: 2024-06-17

## 2024-02-14 NOTE — TELEPHONE ENCOUNTER
Duloxetine 60mg, QD      Pt is wanting to switch to Trulicity due to having trouble getting Ozempic.         [facility]         Daya - [phone]

## 2024-02-26 DIAGNOSIS — G60.0 CHARCOT-MARIE-TOOTH DISEASE: ICD-10-CM

## 2024-02-27 RX ORDER — HYDROCODONE BITARTRATE AND ACETAMINOPHEN 10; 325 MG/1; MG/1
TABLET ORAL
Qty: 120 TABLET | Refills: 0 | Status: SHIPPED | OUTPATIENT
Start: 2024-02-27 | End: 2024-03-25 | Stop reason: SDUPTHER

## 2024-03-01 ENCOUNTER — TELEPHONE (OUTPATIENT)
Dept: FAMILY MEDICINE | Facility: CLINIC | Age: 59
End: 2024-03-01
Payer: MEDICARE

## 2024-03-04 ENCOUNTER — TELEPHONE (OUTPATIENT)
Dept: FAMILY MEDICINE | Facility: CLINIC | Age: 59
End: 2024-03-04
Payer: MEDICARE

## 2024-03-04 DIAGNOSIS — K21.9 GASTROESOPHAGEAL REFLUX DISEASE, UNSPECIFIED WHETHER ESOPHAGITIS PRESENT: ICD-10-CM

## 2024-03-04 DIAGNOSIS — G89.4 CHRONIC PAIN SYNDROME: ICD-10-CM

## 2024-03-04 DIAGNOSIS — F32.9 MAJOR DEPRESSIVE DISORDER WITH CURRENT ACTIVE EPISODE, UNSPECIFIED DEPRESSION EPISODE SEVERITY, UNSPECIFIED WHETHER RECURRENT: Primary | ICD-10-CM

## 2024-03-04 DIAGNOSIS — I10 BENIGN ESSENTIAL HYPERTENSION: ICD-10-CM

## 2024-03-04 RX ORDER — LURASIDONE HYDROCHLORIDE 40 MG/1
40 TABLET, FILM COATED ORAL DAILY
Qty: 90 TABLET | Refills: 1 | Status: SHIPPED | OUTPATIENT
Start: 2024-03-04 | End: 2024-06-18

## 2024-03-04 RX ORDER — AMITRIPTYLINE HYDROCHLORIDE 75 MG/1
75 TABLET ORAL NIGHTLY
Qty: 90 TABLET | Refills: 1 | Status: SHIPPED | OUTPATIENT
Start: 2024-03-04 | End: 2024-08-31

## 2024-03-04 RX ORDER — FAMOTIDINE 20 MG/1
20 TABLET, FILM COATED ORAL 2 TIMES DAILY
Qty: 180 TABLET | Refills: 1 | Status: SHIPPED | OUTPATIENT
Start: 2024-03-04 | End: 2024-08-31

## 2024-03-04 RX ORDER — CARVEDILOL 6.25 MG/1
6.25 TABLET ORAL 2 TIMES DAILY
Qty: 180 TABLET | Refills: 1 | Status: SHIPPED | OUTPATIENT
Start: 2024-03-04 | End: 2024-08-31

## 2024-03-06 ENCOUNTER — TELEPHONE (OUTPATIENT)
Dept: FAMILY MEDICINE | Facility: CLINIC | Age: 59
End: 2024-03-06
Payer: MEDICARE

## 2024-03-06 DIAGNOSIS — E53.8 B12 DEFICIENCY: ICD-10-CM

## 2024-03-06 RX ORDER — CYANOCOBALAMIN 1000 UG/ML
1000 INJECTION, SOLUTION INTRAMUSCULAR; SUBCUTANEOUS
Qty: 1 ML | Refills: 3 | Status: SHIPPED | OUTPATIENT
Start: 2024-03-06

## 2024-03-08 ENCOUNTER — OFFICE VISIT (OUTPATIENT)
Dept: FAMILY MEDICINE | Facility: CLINIC | Age: 59
End: 2024-03-08
Payer: MEDICARE

## 2024-03-08 ENCOUNTER — TELEPHONE (OUTPATIENT)
Dept: FAMILY MEDICINE | Facility: CLINIC | Age: 59
End: 2024-03-08

## 2024-03-08 DIAGNOSIS — E11.40 TYPE 2 DIABETES MELLITUS WITH DIABETIC NEUROPATHY, WITHOUT LONG-TERM CURRENT USE OF INSULIN: ICD-10-CM

## 2024-03-08 DIAGNOSIS — J43.9 PULMONARY EMPHYSEMA, UNSPECIFIED EMPHYSEMA TYPE: ICD-10-CM

## 2024-03-08 DIAGNOSIS — Z12.31 ENCOUNTER FOR SCREENING MAMMOGRAM FOR BREAST CANCER: ICD-10-CM

## 2024-03-08 DIAGNOSIS — G60.0 HEREDITARY SENSORIMOTOR NEUROPATHY: Primary | ICD-10-CM

## 2024-03-08 DIAGNOSIS — Z12.12 ENCOUNTER FOR COLORECTAL CANCER SCREENING: ICD-10-CM

## 2024-03-08 DIAGNOSIS — Z12.11 ENCOUNTER FOR COLORECTAL CANCER SCREENING: ICD-10-CM

## 2024-03-08 DIAGNOSIS — G89.4 CHRONIC PAIN SYNDROME: ICD-10-CM

## 2024-03-08 DIAGNOSIS — F33.9 EPISODE OF RECURRENT MAJOR DEPRESSIVE DISORDER, UNSPECIFIED DEPRESSION EPISODE SEVERITY: ICD-10-CM

## 2024-03-08 PROCEDURE — 99214 OFFICE O/P EST MOD 30 MIN: CPT | Mod: 95,,, | Performed by: FAMILY MEDICINE

## 2024-03-08 NOTE — PROGRESS NOTES
SUBJECTIVE:  Daya Timmons is a 59 y.o. female here for No chief complaint on file.      HPI  Patient being seen as a TeleMed visit today.  She has been seen for follow-up for her mood disorder.  Since increasing Latuda to 40 mg daily things has been doing much better.  She overall feels much happier and handling stressful situations much better.  Her diabetes has been doing well with Trulicity though she has been having a little trouble getting the injections at the pharmacy.  Pain is well managed with hydrocodone that she is taking daily.  Daya's allergies, medications, history, and problem list were updated as appropriate.    Review of Systems   See [facility]  No results found for this or any previous visit (from the past 504 hour(s)).    OBJECTIVE:  Vital signs  There were no vitals filed for this visit.     Physical Exam TeleMed visit today.  She appears to be doing well    ASSESSMENT/PLAN:  1. Hereditary sensorimotor neuropathy  Continue hydrocodone along with amitriptyline and gabapentin    2. Chronic pain syndrome   report chronic hydrocodone therapy    3. Episode of recurrent major depressive disorder, unspecified depression episode severity  Doing well on the combination of Latuda and Cymbalta    4. Pulmonary emphysema, unspecified emphysema type  Stable    5. Type 2 diabetes mellitus with diabetic neuropathy, without long-term current use of insulin  Continue Trulicity and metformin along with Farxiga  Overview:  Lab Results   Component Value Date    HGBA1C 6.2 (H) 01/09/2024    HGBA1C 6.1 (H) 07/27/2023    HGBA1C 6.3 (H) 04/26/2023            This is a TeleMed visit took place between 9:30 a.m. and 9:45 a.m..  The TeleMed visit took place at the patient's home in [facility]    Follow Up:  Follow up in about 2 months (around 5/8/2024) for recheck, fasting labs.

## 2024-03-08 NOTE — TELEPHONE ENCOUNTER
"Spoke to radiology at [facility]-they called pt to matthew mammogram and pt told them "I don't do mammograms."  "

## 2024-03-12 ENCOUNTER — TELEPHONE (OUTPATIENT)
Dept: FAMILY MEDICINE | Facility: CLINIC | Age: 59
End: 2024-03-12
Payer: MEDICARE

## 2024-03-12 DIAGNOSIS — E11.22 TYPE 2 DIABETES MELLITUS WITH STAGE 3A CHRONIC KIDNEY DISEASE, WITHOUT LONG-TERM CURRENT USE OF INSULIN: ICD-10-CM

## 2024-03-12 DIAGNOSIS — N18.31 TYPE 2 DIABETES MELLITUS WITH STAGE 3A CHRONIC KIDNEY DISEASE, WITHOUT LONG-TERM CURRENT USE OF INSULIN: ICD-10-CM

## 2024-03-12 RX ORDER — DULAGLUTIDE 3 MG/.5ML
3 INJECTION, SOLUTION SUBCUTANEOUS
Qty: 4 PEN | Refills: 2 | Status: SHIPPED | OUTPATIENT
Start: 2024-03-12 | End: 2024-05-10

## 2024-03-25 DIAGNOSIS — G60.0 CHARCOT-MARIE-TOOTH DISEASE: ICD-10-CM

## 2024-03-25 DIAGNOSIS — F32.9 MAJOR DEPRESSIVE DISORDER WITH CURRENT ACTIVE EPISODE, UNSPECIFIED DEPRESSION EPISODE SEVERITY, UNSPECIFIED WHETHER RECURRENT: ICD-10-CM

## 2024-03-25 RX ORDER — CLONAZEPAM 1 MG/1
1 TABLET ORAL 2 TIMES DAILY
Qty: 60 TABLET | Refills: 3 | Status: SHIPPED | OUTPATIENT
Start: 2024-03-25

## 2024-03-25 RX ORDER — HYDROCODONE BITARTRATE AND ACETAMINOPHEN 10; 325 MG/1; MG/1
TABLET ORAL
Qty: 120 TABLET | Refills: 0 | Status: SHIPPED | OUTPATIENT
Start: 2024-03-26 | End: 2024-04-23 | Stop reason: SDUPTHER

## 2024-04-19 ENCOUNTER — PATIENT OUTREACH (OUTPATIENT)
Dept: ADMINISTRATIVE | Facility: HOSPITAL | Age: 59
End: 2024-04-19
Payer: MEDICARE

## 2024-04-19 LAB
LEFT EYE DM RETINOPATHY: NEGATIVE
RIGHT EYE DM RETINOPATHY: NEGATIVE

## 2024-04-19 NOTE — PROGRESS NOTES
Health Maintenance Topic(s) Outreach Outcomes & Actions Taken:    Eye Exam - Outreach Outcomes & Actions Taken  : Diabetic Eye External Records Uploaded, Care Team & History Updated if Applicable       Additional Notes:  DM Eye Exam 4/19/24

## 2024-04-23 DIAGNOSIS — G60.0 CHARCOT-MARIE-TOOTH DISEASE: ICD-10-CM

## 2024-04-23 RX ORDER — HYDROCODONE BITARTRATE AND ACETAMINOPHEN 10; 325 MG/1; MG/1
TABLET ORAL
Qty: 120 TABLET | Refills: 0 | Status: SHIPPED | OUTPATIENT
Start: 2024-04-23 | End: 2024-04-29

## 2024-04-29 ENCOUNTER — PATIENT MESSAGE (OUTPATIENT)
Dept: ADMINISTRATIVE | Facility: HOSPITAL | Age: 59
End: 2024-04-29
Payer: MEDICARE

## 2024-04-29 DIAGNOSIS — G89.4 CHRONIC PAIN SYNDROME: Primary | ICD-10-CM

## 2024-04-29 RX ORDER — HYDROCODONE BITARTRATE AND ACETAMINOPHEN 7.5; 325 MG/1; MG/1
1 TABLET ORAL EVERY 6 HOURS PRN
Qty: 120 TABLET | Refills: 0 | Status: SHIPPED | OUTPATIENT
Start: 2024-04-29 | End: 2024-05-30 | Stop reason: SDUPTHER

## 2024-04-30 DIAGNOSIS — J43.9 PULMONARY EMPHYSEMA, UNSPECIFIED EMPHYSEMA TYPE: ICD-10-CM

## 2024-04-30 RX ORDER — ALBUTEROL SULFATE 90 UG/1
AEROSOL, METERED RESPIRATORY (INHALATION)
Qty: 6.7 G | Refills: 3 | Status: SHIPPED | OUTPATIENT
Start: 2024-04-30

## 2024-05-01 DIAGNOSIS — E78.2 MIXED HYPERLIPIDEMIA: ICD-10-CM

## 2024-05-01 RX ORDER — ROSUVASTATIN CALCIUM 10 MG/1
10 TABLET, COATED ORAL NIGHTLY
Qty: 90 TABLET | Refills: 1 | Status: SHIPPED | OUTPATIENT
Start: 2024-05-01

## 2024-05-07 ENCOUNTER — TELEPHONE (OUTPATIENT)
Dept: FAMILY MEDICINE | Facility: CLINIC | Age: 59
End: 2024-05-07
Payer: MEDICARE

## 2024-05-09 ENCOUNTER — LAB REQUISITION (OUTPATIENT)
Dept: LAB | Facility: HOSPITAL | Age: 59
End: 2024-05-09
Payer: MEDICARE

## 2024-05-09 DIAGNOSIS — E11.40 TYPE 2 DIABETES MELLITUS WITH DIABETIC NEUROPATHY, UNSPECIFIED: Primary | ICD-10-CM

## 2024-05-09 DIAGNOSIS — I11.0 HYPERTENSIVE HEART DISEASE WITH HEART FAILURE: ICD-10-CM

## 2024-05-09 DIAGNOSIS — E11.22 TYPE 2 DIABETES MELLITUS WITH DIABETIC CHRONIC KIDNEY DISEASE: ICD-10-CM

## 2024-05-09 DIAGNOSIS — Z91.81 HISTORY OF FALLING: ICD-10-CM

## 2024-05-09 DIAGNOSIS — E11.40 TYPE 2 DIABETES MELLITUS WITH DIABETIC NEUROPATHY, UNSPECIFIED: ICD-10-CM

## 2024-05-09 DIAGNOSIS — N18.30 CHRONIC KIDNEY DISEASE, STAGE 3 UNSPECIFIED: ICD-10-CM

## 2024-05-09 LAB
ALBUMIN SERPL-MCNC: 4.8 G/DL (ref 3.4–5)
ALBUMIN/GLOB SERPL: 1.7 RATIO
ALP SERPL-CCNC: 88 UNIT/L (ref 50–144)
ALT SERPL-CCNC: 116 UNIT/L (ref 1–45)
ANION GAP SERPL CALC-SCNC: 14 MEQ/L (ref 2–13)
AST SERPL-CCNC: 97 UNIT/L (ref 14–36)
BASOPHILS # BLD AUTO: 0.08 X10(3)/MCL (ref 0.01–0.08)
BASOPHILS NFR BLD AUTO: 0.8 % (ref 0.1–1.2)
BILIRUB SERPL-MCNC: 0.5 MG/DL (ref 0–1)
BUN SERPL-MCNC: 24 MG/DL (ref 7–20)
CALCIUM SERPL-MCNC: 9.2 MG/DL (ref 8.4–10.2)
CHLORIDE SERPL-SCNC: 98 MMOL/L (ref 98–110)
CO2 SERPL-SCNC: 28 MMOL/L (ref 21–32)
CREAT SERPL-MCNC: 0.76 MG/DL (ref 0.66–1.25)
CREAT UR-MCNC: 32.8 MG/DL (ref 45–106)
CREAT/UREA NIT SERPL: 32 (ref 12–20)
EOSINOPHIL # BLD AUTO: 0.34 X10(3)/MCL (ref 0.04–0.36)
EOSINOPHIL NFR BLD AUTO: 3.6 % (ref 0.7–7)
ERYTHROCYTE [DISTWIDTH] IN BLOOD BY AUTOMATED COUNT: 15.5 % (ref 11–14.5)
EST. AVERAGE GLUCOSE BLD GHB EST-MCNC: 197.3 MG/DL (ref 70–115)
GFR SERPLBLD CREATININE-BSD FMLA CKD-EPI: 90 ML/MIN/1.73/M2
GLOBULIN SER-MCNC: 2.8 GM/DL (ref 2–3.9)
GLUCOSE SERPL-MCNC: 172 MG/DL (ref 70–115)
HBA1C MFR BLD: 8.5 % (ref 4–6)
HCT VFR BLD AUTO: 43.4 % (ref 36–48)
HGB BLD-MCNC: 13.9 G/DL (ref 11.8–16)
IMM GRANULOCYTES # BLD AUTO: 0.04 X10(3)/MCL (ref 0–0.03)
IMM GRANULOCYTES NFR BLD AUTO: 0.4 % (ref 0–0.5)
LYMPHOCYTES # BLD AUTO: 2.33 X10(3)/MCL (ref 1.16–3.74)
LYMPHOCYTES NFR BLD AUTO: 24.3 % (ref 20–55)
MCH RBC QN AUTO: 26.4 PG (ref 27–34)
MCHC RBC AUTO-ENTMCNC: 32 G/DL (ref 31–37)
MCV RBC AUTO: 82.5 FL (ref 79–99)
MICROALBUMIN UR-MCNC: <5 UG/ML
MICROALBUMIN/CREAT RATIO PNL UR: ABNORMAL
MONOCYTES # BLD AUTO: 0.56 X10(3)/MCL (ref 0.24–0.36)
MONOCYTES NFR BLD AUTO: 5.9 % (ref 4.7–12.5)
NEUTROPHILS # BLD AUTO: 6.22 X10(3)/MCL (ref 1.56–6.13)
NEUTROPHILS NFR BLD AUTO: 65 % (ref 37–73)
NRBC BLD AUTO-RTO: 0 %
PLATELET # BLD AUTO: 215 X10(3)/MCL (ref 140–371)
PMV BLD AUTO: 9.5 FL (ref 9.4–12.4)
POTASSIUM SERPL-SCNC: 4.3 MMOL/L (ref 3.5–5.1)
PROT SERPL-MCNC: 7.6 GM/DL (ref 6.3–8.2)
RBC # BLD AUTO: 5.26 X10(6)/MCL (ref 4–5.1)
SODIUM SERPL-SCNC: 140 MMOL/L (ref 136–145)
WBC # SPEC AUTO: 9.57 X10(3)/MCL (ref 4–11.5)

## 2024-05-09 PROCEDURE — 85025 COMPLETE CBC W/AUTO DIFF WBC: CPT | Performed by: FAMILY MEDICINE

## 2024-05-09 PROCEDURE — 82043 UR ALBUMIN QUANTITATIVE: CPT | Performed by: FAMILY MEDICINE

## 2024-05-09 PROCEDURE — 80053 COMPREHEN METABOLIC PANEL: CPT | Performed by: FAMILY MEDICINE

## 2024-05-09 PROCEDURE — 83036 HEMOGLOBIN GLYCOSYLATED A1C: CPT | Performed by: FAMILY MEDICINE

## 2024-05-10 ENCOUNTER — PATIENT MESSAGE (OUTPATIENT)
Dept: FAMILY MEDICINE | Facility: CLINIC | Age: 59
End: 2024-05-10

## 2024-05-10 ENCOUNTER — OFFICE VISIT (OUTPATIENT)
Dept: FAMILY MEDICINE | Facility: CLINIC | Age: 59
End: 2024-05-10
Payer: MEDICARE

## 2024-05-10 VITALS
HEIGHT: 68 IN | OXYGEN SATURATION: 98 % | TEMPERATURE: 100 F | DIASTOLIC BLOOD PRESSURE: 77 MMHG | HEART RATE: 80 BPM | BODY MASS INDEX: 44.41 KG/M2 | WEIGHT: 293 LBS | SYSTOLIC BLOOD PRESSURE: 138 MMHG

## 2024-05-10 DIAGNOSIS — E11.22 TYPE 2 DIABETES MELLITUS WITH STAGE 3A CHRONIC KIDNEY DISEASE, WITHOUT LONG-TERM CURRENT USE OF INSULIN: Primary | ICD-10-CM

## 2024-05-10 DIAGNOSIS — N18.31 TYPE 2 DIABETES MELLITUS WITH STAGE 3A CHRONIC KIDNEY DISEASE, WITHOUT LONG-TERM CURRENT USE OF INSULIN: Primary | ICD-10-CM

## 2024-05-10 DIAGNOSIS — N18.30 STAGE 3 CHRONIC KIDNEY DISEASE, UNSPECIFIED WHETHER STAGE 3A OR 3B CKD: ICD-10-CM

## 2024-05-10 DIAGNOSIS — G44.86 CERVICOGENIC HEADACHE: ICD-10-CM

## 2024-05-10 DIAGNOSIS — R74.01 TRANSAMINITIS: ICD-10-CM

## 2024-05-10 DIAGNOSIS — J43.9 PULMONARY EMPHYSEMA, UNSPECIFIED EMPHYSEMA TYPE: ICD-10-CM

## 2024-05-10 DIAGNOSIS — E11.40 TYPE 2 DIABETES MELLITUS WITH DIABETIC NEUROPATHY, WITHOUT LONG-TERM CURRENT USE OF INSULIN: ICD-10-CM

## 2024-05-10 DIAGNOSIS — G89.4 CHRONIC PAIN SYNDROME: Primary | ICD-10-CM

## 2024-05-10 PROCEDURE — 99214 OFFICE O/P EST MOD 30 MIN: CPT | Mod: ,,, | Performed by: FAMILY MEDICINE

## 2024-05-10 RX ORDER — BUTORPHANOL TARTRATE 10 MG/ML
1 SPRAY NASAL DAILY PRN
Qty: 2.5 ML | Refills: 0 | Status: SHIPPED | OUTPATIENT
Start: 2024-05-28 | End: 2024-05-10 | Stop reason: SDUPTHER

## 2024-05-10 RX ORDER — SEMAGLUTIDE 2.68 MG/ML
2 INJECTION, SOLUTION SUBCUTANEOUS
Qty: 9 ML | Refills: 1 | Status: SHIPPED | OUTPATIENT
Start: 2024-05-10 | End: 2024-05-14 | Stop reason: SDUPTHER

## 2024-05-10 RX ORDER — BUTORPHANOL TARTRATE 10 MG/ML
1 SPRAY NASAL DAILY PRN
Qty: 2.5 ML | Refills: 0 | Status: SHIPPED | OUTPATIENT
Start: 2024-05-28

## 2024-05-10 NOTE — PROGRESS NOTES
SUBJECTIVE:  Daya Timmons is a 59 y.o. female here for Diabetes (F/U diabetes)      HPI  Patient is here for follow-up on chronic medical conditions.  See assessment and plan for individual list of issues.  Daya's allergies, medications, history, and problem list were updated as appropriate.    Review of Systems   She has been having frequent headaches.  She has a wedding to go too soon and would like to get a dose of stayed all nasal spray to help her get through the wedding.  She used this in the past and worked very well.    Recent Results (from the past 504 hour(s))   Comprehensive Metabolic Panel    Collection Time: 05/09/24  9:20 AM   Result Value Ref Range    Sodium 140 136 - 145 mmol/L    Potassium 4.3 3.5 - 5.1 mmol/L    Chloride 98 98 - 110 mmol/L    CO2 28 21 - 32 mmol/L    Glucose 172 (H) 70 - 115 mg/dL    Blood Urea Nitrogen 24 (H) 7.0 - 20.0 mg/dL    Creatinine 0.76 0.66 - 1.25 mg/dL    Calcium 9.2 8.4 - 10.2 mg/dL    Protein Total 7.6 6.3 - 8.2 gm/dL    Albumin 4.8 3.4 - 5.0 g/dL    Globulin 2.8 2.0 - 3.9 gm/dL    Albumin/Globulin Ratio 1.7 ratio    Bilirubin Total 0.5 0.0 - 1.0 mg/dL    ALP 88 50 - 144 unit/L     (H) 1 - 45 unit/L    AST 97 (H) 14 - 36 unit/L    eGFR 90 mL/min/1.73/m2    Anion Gap 14.0 (H) 2.0 - 13.0 mEq/L    BUN/Creatinine Ratio 32 (H) 12 - 20   Microalbumin/Creatinine Ratio, Urine    Collection Time: 05/09/24  9:20 AM   Result Value Ref Range    Urine Microalbumin <5.0 <=30.0 ug/mL    Urine Creatinine 32.8 (L) 45.0 - 106.0 mg/dL    Microalbumin Creatinine Ratio     CBC with Differential    Collection Time: 05/09/24  9:20 AM   Result Value Ref Range    WBC 9.57 4.00 - 11.50 x10(3)/mcL    RBC 5.26 (H) 4.00 - 5.10 x10(6)/mcL    Hgb 13.9 11.8 - 16.0 g/dL    Hct 43.4 36.0 - 48.0 %    MCV 82.5 79.0 - 99.0 fL    MCH 26.4 (L) 27.0 - 34.0 pg    MCHC 32.0 31.0 - 37.0 g/dL    RDW 15.5 (H) 11.0 - 14.5 %    Platelet 215 140 - 371 x10(3)/mcL    MPV 9.5 9.4 - 12.4 fL    Neut % 65.0 37 - 73  "%    Lymph % 24.3 20 - 55 %    Mono % 5.9 4.7 - 12.5 %    Eos % 3.6 0.7 - 7 %    Basophil % 0.8 0.1 - 1.2 %    Lymph # 2.33 1.16 - 3.74 x10(3)/mcL    Neut # 6.22 (H) 1.56 - 6.13 x10(3)/mcL    Mono # 0.56 (H) 0.24 - 0.36 x10(3)/mcL    Eos # 0.34 0.04 - 0.36 x10(3)/mcL    Baso # 0.08 0.01 - 0.08 x10(3)/mcL    IG# 0.04 (H) 0.0001 - 0.031 x10(3)/mcL    IG% 0.4 0 - 0.5 %    NRBC% 0.0 <=1 %   Hemoglobin A1C    Collection Time: 05/09/24  9:20 AM   Result Value Ref Range    Hemoglobin A1c 8.5 (H) 4.0 - 6.0 %    Estimated Average Glucose 197.3 (H) 70.0 - 115.0 mg/dL       OBJECTIVE:  Vital signs  Vitals:    05/10/24 0936   BP: 138/77   BP Location: Right arm   Pulse: 80   Temp: 99.8 °F (37.7 °C)   TempSrc: Oral   SpO2: 98%   Weight: (!) 145.4 kg (320 lb 9.6 oz)   Height: 5' 7.72" (1.72 m)        Physical Exam has a obesity, lungs diminished but clear today    ASSESSMENT/PLAN:  1. Type 2 diabetes mellitus with stage 3a chronic kidney disease, without long-term current use of insulin  A1c has gone up quite a bit in the last 6 months.  She is now above 8.  I would like to put her on a continuous glucose monitor so this can help her with food choices.  I would also like to switch her Trulicity to Ozempic 2 mg weekly as she seemed to do better with this in the past.  Continue metformin and Farxiga at this time   -     flash glucose sensor Kit; 1 Units by Misc.(Non-Drug; Combo Route) route every 14 (fourteen) days.  Dispense: 2 kit; Refill: 11  -     blood-glucose meter,continuous Misc; 1 Units by Misc.(Non-Drug; Combo Route) route once. for 1 dose  Dispense: 1 each; Refill: 0    2. Cervicogenic headache  She is on hydrocodone regularly for severe neuropathy.  I will give her a 1 time prescription for stayed all to help with a wedding that she has to go too soon    3. Pulmonary emphysema, unspecified emphysema type  Continue albuterol as needed    4. Stage 3 chronic kidney disease, unspecified whether stage 3a or 3b CKD  Stable at " this time  Overview:  Lab Results   Component Value Date    CREATININE 0.95 01/09/2024                   6. Transaminitis  , AST 97.  Liver enzymes has been in this range for the last 12 months.  We would like to get an ultrasound of the liver.  May need to consider metformin as a cause  Other orders  -     semaglutide (OZEMPIC) 2 mg/dose (8 mg/3 mL) PnIj; Inject 2 mg into the skin every 7 days.  Dispense: 9 mL; Refill: 1         Follow Up:  Follow up in about 2 months (around 7/10/2024) for recheck, fasting labs.

## 2024-05-14 ENCOUNTER — TELEPHONE (OUTPATIENT)
Dept: FAMILY MEDICINE | Facility: CLINIC | Age: 59
End: 2024-05-14
Payer: MEDICARE

## 2024-05-14 DIAGNOSIS — G89.4 CHRONIC PAIN SYNDROME: ICD-10-CM

## 2024-05-14 DIAGNOSIS — E11.40 TYPE 2 DIABETES MELLITUS WITH DIABETIC NEUROPATHY, WITHOUT LONG-TERM CURRENT USE OF INSULIN: Primary | ICD-10-CM

## 2024-05-14 RX ORDER — TRIPROLIDINE/PSEUDOEPHEDRINE 2.5MG-60MG
TABLET ORAL
Qty: 900 ML | Refills: 1 | Status: SHIPPED | OUTPATIENT
Start: 2024-05-14

## 2024-05-14 RX ORDER — SEMAGLUTIDE 2.68 MG/ML
2 INJECTION, SOLUTION SUBCUTANEOUS
Qty: 3 ML | Refills: 5 | Status: SHIPPED | OUTPATIENT
Start: 2024-05-14 | End: 2024-11-10

## 2024-05-15 ENCOUNTER — TELEPHONE (OUTPATIENT)
Dept: FAMILY MEDICINE | Facility: CLINIC | Age: 59
End: 2024-05-15

## 2024-05-20 DIAGNOSIS — G62.9 NEUROPATHY: Primary | ICD-10-CM

## 2024-05-20 RX ORDER — GABAPENTIN 600 MG/1
TABLET ORAL
Qty: 270 TABLET | Refills: 0 | Status: SHIPPED | OUTPATIENT
Start: 2024-05-20

## 2024-05-28 ENCOUNTER — TELEPHONE (OUTPATIENT)
Dept: FAMILY MEDICINE | Facility: CLINIC | Age: 59
End: 2024-05-28
Payer: MEDICARE

## 2024-05-28 DIAGNOSIS — R11.0 NAUSEA: Primary | ICD-10-CM

## 2024-05-28 RX ORDER — PROMETHAZINE HYDROCHLORIDE 25 MG/1
25 TABLET ORAL EVERY 6 HOURS PRN
Qty: 10 TABLET | Refills: 0 | Status: SHIPPED | OUTPATIENT
Start: 2024-05-28

## 2024-05-30 DIAGNOSIS — G89.4 CHRONIC PAIN SYNDROME: ICD-10-CM

## 2024-05-30 RX ORDER — HYDROCODONE BITARTRATE AND ACETAMINOPHEN 7.5; 325 MG/1; MG/1
1 TABLET ORAL EVERY 6 HOURS PRN
Qty: 120 TABLET | Refills: 0 | Status: SHIPPED | OUTPATIENT
Start: 2024-05-30

## 2024-06-10 ENCOUNTER — TELEPHONE (OUTPATIENT)
Dept: FAMILY MEDICINE | Facility: CLINIC | Age: 59
End: 2024-06-10
Payer: MEDICARE

## 2024-06-10 NOTE — TELEPHONE ENCOUNTER
Dr. Rodriguez already said that was a 1 time fill. She's asked for a couple of weeks ago and he said no. It was a 1 time thing.

## 2024-06-13 ENCOUNTER — TELEPHONE (OUTPATIENT)
Dept: FAMILY MEDICINE | Facility: CLINIC | Age: 59
End: 2024-06-13
Payer: MEDICARE

## 2024-06-13 NOTE — TELEPHONE ENCOUNTER
Terrie with [facility] called -pt is having an increase in jaw movement,tongue rolling and hands shaking.Pt is on Lutuda and is asking if the medication could be to blame?

## 2024-06-17 DIAGNOSIS — F32.9 MAJOR DEPRESSIVE DISORDER WITH CURRENT ACTIVE EPISODE, UNSPECIFIED DEPRESSION EPISODE SEVERITY, UNSPECIFIED WHETHER RECURRENT: ICD-10-CM

## 2024-06-17 RX ORDER — DULOXETIN HYDROCHLORIDE 60 MG/1
CAPSULE, DELAYED RELEASE ORAL
Qty: 30 CAPSULE | Refills: 3 | Status: SHIPPED | OUTPATIENT
Start: 2024-06-17

## 2024-06-18 ENCOUNTER — OFFICE VISIT (OUTPATIENT)
Dept: FAMILY MEDICINE | Facility: CLINIC | Age: 59
End: 2024-06-18
Payer: MEDICARE

## 2024-06-18 DIAGNOSIS — G89.4 CHRONIC PAIN SYNDROME: ICD-10-CM

## 2024-06-18 DIAGNOSIS — F33.9 EPISODE OF RECURRENT MAJOR DEPRESSIVE DISORDER, UNSPECIFIED DEPRESSION EPISODE SEVERITY: Primary | ICD-10-CM

## 2024-06-18 DIAGNOSIS — F32.9 MAJOR DEPRESSIVE DISORDER WITH CURRENT ACTIVE EPISODE, UNSPECIFIED DEPRESSION EPISODE SEVERITY, UNSPECIFIED WHETHER RECURRENT: ICD-10-CM

## 2024-06-18 DIAGNOSIS — G62.9 NEUROPATHY: ICD-10-CM

## 2024-06-18 PROCEDURE — 99214 OFFICE O/P EST MOD 30 MIN: CPT | Mod: 95,,, | Performed by: FAMILY MEDICINE

## 2024-06-18 RX ORDER — LURASIDONE HYDROCHLORIDE 20 MG/1
20 TABLET, FILM COATED ORAL DAILY
Qty: 90 TABLET | Refills: 1 | Status: SHIPPED | OUTPATIENT
Start: 2024-06-18 | End: 2024-12-15

## 2024-06-18 NOTE — PROGRESS NOTES
SUBJECTIVE:  Daya Timmons is a 59 y.o. female here for No chief complaint on file.      HPI  Patient requested a TeleMed visit to go over some symptoms she has been having.  She has been having some dystonic type symptoms with some strange feeling in her jaw and face for the last several months.  It seems worse since we increased her Latuda.  She also wanted to discuss her chronic pain and headaches.  The other day she took the stayed all nasal spray and really felt much better.  She was able to actually do some house work and get up and move around more than usual.  She is now back on the hydrocodone.  She had taken the stayed all when hydrocodone was difficult to get.  Daya's allergies, medications, history, and problem list were updated as appropriate.    Review of Systems   See HPI   No results found for this or any previous visit (from the past 504 hour(s)).    OBJECTIVE:  Vital signs  There were no vitals filed for this visit.     Physical Exam this is a TeleMed visit.  Patient appeared to be doing well and in no distress    ASSESSMENT/PLAN:  1. Episode of recurrent major depressive disorder, unspecified depression episode severity  We will decrease Latuda 20 mg daily to see if some of these dyskinesias improve as I do think it is probably medication related.  She has done much better since starting Latuda as far as her depression and paranoia.  If some of these symptoms come back I would recommend going back up on Latuda and maybe adding Cogentin daily    2. Neuropathy  On chronic opioids for this    3. Chronic pain syndrome  She is on both duloxetine opioids    4. Major depressive disorder with current active episode, unspecified depression episode severity, unspecified whether recurrent    -     lurasidone (LATUDA) 20 mg Tab tablet; Take 1 tablet (20 mg total) by mouth once daily.  Dispense: 90 tablet; Refill: 1         Follow Up:  No follow-ups on file.  This is a TeleMed visit that took place between 11  15 and 11:30 a.m. at the patient's home in [facility]

## 2024-06-19 ENCOUNTER — PATIENT MESSAGE (OUTPATIENT)
Facility: CLINIC | Age: 59
End: 2024-06-19
Payer: MEDICARE

## 2024-07-08 DIAGNOSIS — F32.9 MAJOR DEPRESSIVE DISORDER WITH CURRENT ACTIVE EPISODE, UNSPECIFIED DEPRESSION EPISODE SEVERITY, UNSPECIFIED WHETHER RECURRENT: ICD-10-CM

## 2024-07-08 DIAGNOSIS — N18.31 TYPE 2 DIABETES MELLITUS WITH STAGE 3A CHRONIC KIDNEY DISEASE, WITHOUT LONG-TERM CURRENT USE OF INSULIN: ICD-10-CM

## 2024-07-08 DIAGNOSIS — K21.9 GASTROESOPHAGEAL REFLUX DISEASE, UNSPECIFIED WHETHER ESOPHAGITIS PRESENT: ICD-10-CM

## 2024-07-08 DIAGNOSIS — G89.4 CHRONIC PAIN SYNDROME: ICD-10-CM

## 2024-07-08 DIAGNOSIS — E11.22 TYPE 2 DIABETES MELLITUS WITH STAGE 3A CHRONIC KIDNEY DISEASE, WITHOUT LONG-TERM CURRENT USE OF INSULIN: ICD-10-CM

## 2024-07-08 DIAGNOSIS — E11.40 TYPE 2 DIABETES MELLITUS WITH DIABETIC NEUROPATHY, WITHOUT LONG-TERM CURRENT USE OF INSULIN: ICD-10-CM

## 2024-07-08 RX ORDER — SEMAGLUTIDE 2.68 MG/ML
2 INJECTION, SOLUTION SUBCUTANEOUS
Qty: 3 ML | Refills: 5 | Status: SHIPPED | OUTPATIENT
Start: 2024-07-08 | End: 2025-01-04

## 2024-07-08 RX ORDER — CLONAZEPAM 1 MG/1
1 TABLET ORAL 2 TIMES DAILY
Qty: 60 TABLET | Refills: 3 | Status: SHIPPED | OUTPATIENT
Start: 2024-07-08

## 2024-07-08 RX ORDER — HYDROCODONE BITARTRATE AND ACETAMINOPHEN 7.5; 325 MG/1; MG/1
1 TABLET ORAL EVERY 6 HOURS PRN
Qty: 120 TABLET | Refills: 0 | Status: SHIPPED | OUTPATIENT
Start: 2024-07-08

## 2024-07-08 RX ORDER — METFORMIN HYDROCHLORIDE 1000 MG/1
1000 TABLET ORAL 2 TIMES DAILY
Qty: 180 TABLET | Refills: 1 | Status: SHIPPED | OUTPATIENT
Start: 2024-07-08 | End: 2025-01-04

## 2024-07-08 RX ORDER — PANTOPRAZOLE SODIUM 40 MG/1
40 TABLET, DELAYED RELEASE ORAL DAILY
Qty: 90 TABLET | Refills: 1 | Status: SHIPPED | OUTPATIENT
Start: 2024-07-08 | End: 2025-01-04

## 2024-07-08 NOTE — TELEPHONE ENCOUNTER
Clonazepam 1mg, BID   Pantoprazole 40mg, QD   Metformin 1000mg, BID   Norco 7.5mg, Q6H  Ozempic 2mg, Q7D    Pt states that she is off of probation & would like the medical marijuana started.     Please advise.     [facility]

## 2024-07-09 ENCOUNTER — PATIENT OUTREACH (OUTPATIENT)
Facility: CLINIC | Age: 59
End: 2024-07-09
Payer: MEDICARE

## 2024-07-09 ENCOUNTER — TELEPHONE (OUTPATIENT)
Dept: FAMILY MEDICINE | Facility: CLINIC | Age: 59
End: 2024-07-09
Payer: MEDICARE

## 2024-07-09 NOTE — PROGRESS NOTES
"Health Maintenance Topic(s) Outreach Outcomes & Actions Taken:    Breast Cancer Screening - Outreach Outcomes & Actions Taken  : Pt Declined Scheduling Mammogram    Colorectal Cancer Screening - Outreach Outcomes & Actions Taken  : Pt Declined Completing Colorectal Cancer Screening     Additional Notes:  Patient refuses Mammogram & Colorectal cancer screenings. States "If something is wrong with me, I don't want to know ".  FYI flag added to chart.    PCP f/u 8/2/2024     "

## 2024-07-16 ENCOUNTER — TELEPHONE (OUTPATIENT)
Dept: FAMILY MEDICINE | Facility: CLINIC | Age: 59
End: 2024-07-16
Payer: MEDICARE

## 2024-07-17 ENCOUNTER — PATIENT MESSAGE (OUTPATIENT)
Facility: CLINIC | Age: 59
End: 2024-07-17
Payer: MEDICARE

## 2024-07-18 ENCOUNTER — LAB REQUISITION (OUTPATIENT)
Dept: LAB | Facility: HOSPITAL | Age: 59
End: 2024-07-18
Payer: MEDICARE

## 2024-07-18 DIAGNOSIS — E11.22 TYPE 2 DIABETES MELLITUS WITH DIABETIC CHRONIC KIDNEY DISEASE: ICD-10-CM

## 2024-07-18 DIAGNOSIS — R74.01 ELEVATION OF LEVELS OF LIVER TRANSAMINASE LEVELS: ICD-10-CM

## 2024-07-18 DIAGNOSIS — E11.40 TYPE 2 DIABETES MELLITUS WITH DIABETIC NEUROPATHY, UNSPECIFIED: ICD-10-CM

## 2024-07-18 DIAGNOSIS — G44.86 CERVICOGENIC HEADACHE: ICD-10-CM

## 2024-07-18 DIAGNOSIS — N18.31 CHRONIC KIDNEY DISEASE, STAGE 3A: ICD-10-CM

## 2024-07-18 DIAGNOSIS — N18.30 CHRONIC KIDNEY DISEASE, STAGE 3 UNSPECIFIED: ICD-10-CM

## 2024-07-18 DIAGNOSIS — J43.9 EMPHYSEMA, UNSPECIFIED: ICD-10-CM

## 2024-07-18 LAB
ALBUMIN SERPL-MCNC: 4.5 G/DL (ref 3.4–5)
ALBUMIN/GLOB SERPL: 1.7 RATIO
ALP SERPL-CCNC: 74 UNIT/L (ref 50–144)
ALT SERPL-CCNC: 86 UNIT/L (ref 1–45)
ANION GAP SERPL CALC-SCNC: 12 MEQ/L (ref 2–13)
AST SERPL-CCNC: 81 UNIT/L (ref 14–36)
BASOPHILS # BLD AUTO: 0.03 X10(3)/MCL (ref 0.01–0.08)
BASOPHILS NFR BLD AUTO: 0.4 % (ref 0.1–1.2)
BILIRUB SERPL-MCNC: 0.4 MG/DL (ref 0–1)
BUN SERPL-MCNC: 19 MG/DL (ref 7–20)
CALCIUM SERPL-MCNC: 9.6 MG/DL (ref 8.4–10.2)
CHLORIDE SERPL-SCNC: 97 MMOL/L (ref 98–110)
CO2 SERPL-SCNC: 30 MMOL/L (ref 21–32)
CREAT SERPL-MCNC: 0.75 MG/DL (ref 0.66–1.25)
CREAT/UREA NIT SERPL: 25 (ref 12–20)
EOSINOPHIL # BLD AUTO: 0.2 X10(3)/MCL (ref 0.04–0.36)
EOSINOPHIL NFR BLD AUTO: 2.8 % (ref 0.7–7)
ERYTHROCYTE [DISTWIDTH] IN BLOOD BY AUTOMATED COUNT: 15.6 % (ref 11–14.5)
EST. AVERAGE GLUCOSE BLD GHB EST-MCNC: 171.4 MG/DL (ref 70–115)
FERRITIN SERPL-MCNC: 47.9 NG/ML (ref 6.24–264)
GFR SERPLBLD CREATININE-BSD FMLA CKD-EPI: >90 ML/MIN/1.73/M2
GLOBULIN SER-MCNC: 2.6 GM/DL (ref 2–3.9)
GLUCOSE SERPL-MCNC: 106 MG/DL (ref 70–115)
HBA1C MFR BLD: 7.6 % (ref 4–6)
HCT VFR BLD AUTO: 42.7 % (ref 36–48)
HGB BLD-MCNC: 13.4 G/DL (ref 11.8–16)
IMM GRANULOCYTES # BLD AUTO: 0.01 X10(3)/MCL (ref 0–0.03)
IMM GRANULOCYTES NFR BLD AUTO: 0.1 % (ref 0–0.5)
IRON SATN MFR SERPL: 15 % (ref 20–50)
IRON SERPL-MCNC: 54 UG/DL (ref 50–170)
LYMPHOCYTES # BLD AUTO: 1.87 X10(3)/MCL (ref 1.16–3.74)
LYMPHOCYTES NFR BLD AUTO: 26.5 % (ref 20–55)
MCH RBC QN AUTO: 25.8 PG (ref 27–34)
MCHC RBC AUTO-ENTMCNC: 31.4 G/DL (ref 31–37)
MCV RBC AUTO: 82.1 FL (ref 79–99)
MONOCYTES # BLD AUTO: 0.35 X10(3)/MCL (ref 0.24–0.36)
MONOCYTES NFR BLD AUTO: 5 % (ref 4.7–12.5)
NEUTROPHILS # BLD AUTO: 4.6 X10(3)/MCL (ref 1.56–6.13)
NEUTROPHILS NFR BLD AUTO: 65.2 % (ref 37–73)
PLATELET # BLD AUTO: 222 X10(3)/MCL (ref 140–371)
PMV BLD AUTO: 9.6 FL (ref 9.4–12.4)
POTASSIUM SERPL-SCNC: 4.3 MMOL/L (ref 3.5–5.1)
PROT SERPL-MCNC: 7.1 GM/DL (ref 6.3–8.2)
RBC # BLD AUTO: 5.2 X10(6)/MCL (ref 4–5.1)
SODIUM SERPL-SCNC: 139 MMOL/L (ref 136–145)
TIBC SERPL-MCNC: 297 UG/DL (ref 70–310)
TIBC SERPL-MCNC: 351 UG/DL (ref 250–450)
TRANSFERRIN SERPL-MCNC: 334 MG/DL (ref 180–382)
WBC # BLD AUTO: 7.06 X10(3)/MCL (ref 4–11.5)

## 2024-07-18 PROCEDURE — 83036 HEMOGLOBIN GLYCOSYLATED A1C: CPT | Performed by: FAMILY MEDICINE

## 2024-07-18 PROCEDURE — 83550 IRON BINDING TEST: CPT | Performed by: FAMILY MEDICINE

## 2024-07-18 PROCEDURE — 85025 COMPLETE CBC W/AUTO DIFF WBC: CPT | Performed by: FAMILY MEDICINE

## 2024-07-18 PROCEDURE — 83540 ASSAY OF IRON: CPT | Performed by: FAMILY MEDICINE

## 2024-07-18 PROCEDURE — 82728 ASSAY OF FERRITIN: CPT | Performed by: FAMILY MEDICINE

## 2024-07-18 PROCEDURE — 80053 COMPREHEN METABOLIC PANEL: CPT | Performed by: FAMILY MEDICINE

## 2024-07-24 ENCOUNTER — OFFICE VISIT (OUTPATIENT)
Dept: FAMILY MEDICINE | Facility: CLINIC | Age: 59
End: 2024-07-24
Payer: MEDICARE

## 2024-07-24 DIAGNOSIS — F33.9 EPISODE OF RECURRENT MAJOR DEPRESSIVE DISORDER, UNSPECIFIED DEPRESSION EPISODE SEVERITY: ICD-10-CM

## 2024-07-24 DIAGNOSIS — E11.40 TYPE 2 DIABETES MELLITUS WITH DIABETIC NEUROPATHY, WITHOUT LONG-TERM CURRENT USE OF INSULIN: ICD-10-CM

## 2024-07-24 DIAGNOSIS — G89.4 CHRONIC PAIN SYNDROME: Primary | ICD-10-CM

## 2024-07-24 DIAGNOSIS — G60.0 HEREDITARY SENSORIMOTOR NEUROPATHY: ICD-10-CM

## 2024-07-24 PROBLEM — G93.40 ACUTE ENCEPHALOPATHY: Status: RESOLVED | Noted: 2023-08-02 | Resolved: 2024-07-24

## 2024-07-24 PROCEDURE — 99214 OFFICE O/P EST MOD 30 MIN: CPT | Mod: 95,,, | Performed by: FAMILY MEDICINE

## 2024-07-24 NOTE — PROGRESS NOTES
SUBJECTIVE:  Daya Timmons is a 59 y.o. female here for No chief complaint on file.      HPI  Patient being seen as a TeleMed visit today.  This was requested by the patient.  She has been seen for follow-up on chronic conditions.  See assessment and plan for individual list of issues.  Daya's allergies, medications, history, and problem list were updated as appropriate.    Review of Systems   Since reducing Latuda she has noticed her lip movements have improved..    Recent Results (from the past 504 hour(s))   Comprehensive Metabolic Panel    Collection Time: 07/18/24  9:15 AM   Result Value Ref Range    Sodium 139 136 - 145 mmol/L    Potassium 4.3 3.5 - 5.1 mmol/L    Chloride 97 (L) 98 - 110 mmol/L    CO2 30 21 - 32 mmol/L    Glucose 106 70 - 115 mg/dL    Blood Urea Nitrogen 19 7.0 - 20.0 mg/dL    Creatinine 0.75 0.66 - 1.25 mg/dL    Calcium 9.6 8.4 - 10.2 mg/dL    Protein Total 7.1 6.3 - 8.2 gm/dL    Albumin 4.5 3.4 - 5.0 g/dL    Globulin 2.6 2.0 - 3.9 gm/dL    Albumin/Globulin Ratio 1.7 ratio    Bilirubin Total 0.4 0.0 - 1.0 mg/dL    ALP 74 50 - 144 unit/L    ALT 86 (H) 1 - 45 unit/L    AST 81 (H) 14 - 36 unit/L    eGFR >90 mL/min/1.73/m2    Anion Gap 12.0 2.0 - 13.0 mEq/L    BUN/Creatinine Ratio 25 (H) 12 - 20   Hemoglobin A1C    Collection Time: 07/18/24  9:15 AM   Result Value Ref Range    Hemoglobin A1c 7.6 (H) 4.0 - 6.0 %    Estimated Average Glucose 171.4 (H) 70.0 - 115.0 mg/dL   Iron and TIBC    Collection Time: 07/18/24  9:15 AM   Result Value Ref Range    Iron Binding Capacity Unsaturated 297 70 - 310 ug/dL    Iron Level 54 50 - 170 ug/dL    Transferrin 334 180 - 382 mg/dL    Iron Binding Capacity Total 351 250 - 450 ug/dL    Iron Saturation 15 (L) 20 - 50 %   Ferritin    Collection Time: 07/18/24  9:15 AM   Result Value Ref Range    Ferritin Level 47.9 6.24 - 264.00 ng/mL   CBC with Differential    Collection Time: 07/18/24  9:15 AM   Result Value Ref Range    WBC 7.06 4.00 - 11.50 x10(3)/mcL    RBC  5.20 (H) 4.00 - 5.10 x10(6)/mcL    Hgb 13.4 11.8 - 16.0 g/dL    Hct 42.7 36.0 - 48.0 %    MCV 82.1 79.0 - 99.0 fL    MCH 25.8 (L) 27.0 - 34.0 pg    MCHC 31.4 31.0 - 37.0 g/dL    RDW 15.6 (H) 11.0 - 14.5 %    Platelet 222 140 - 371 x10(3)/mcL    MPV 9.6 9.4 - 12.4 fL    Neut % 65.2 37 - 73 %    Lymph % 26.5 20 - 55 %    Mono % 5.0 4.7 - 12.5 %    Eos % 2.8 0.7 - 7 %    Basophil % 0.4 0.1 - 1.2 %    Lymph # 1.87 1.16 - 3.74 x10(3)/mcL    Neut # 4.60 1.56 - 6.13 x10(3)/mcL    Mono # 0.35 0.24 - 0.36 x10(3)/mcL    Eos # 0.20 0.04 - 0.36 x10(3)/mcL    Baso # 0.03 0.01 - 0.08 x10(3)/mcL    IG# 0.01 0.0001 - 0.031 x10(3)/mcL    IG% 0.1 0 - 0.5 %       OBJECTIVE:  Vital signs  There were no vitals filed for this visit.     Physical Exam this is a TeleMed visit.  Patient appeared to be in good spirits and in no distress    ASSESSMENT/PLAN:  1. Chronic pain syndrome  She has been actually doing a little better and not taking as much hydrocodone.  She would like to try medicinal THC to see if she can even decrease her hydrocodone use more    2. Hereditary sensorimotor neuropathy  She is fairly severe neuropathy in the legs and feet.  This affects her ADLs and requires her to have assistance in her home    3. Type 2 diabetes mellitus with diabetic neuropathy, without long-term current use of insulin  A1c is down 8.5 down to 7.6.  Continue Ozempic and metformin.  May consider SGLT2 inhibitor    4. Episode of recurrent major depressive disorder, unspecified depression episode severity  Continue duloxetine.  Go ahead and discontinue Latuda as I think this was giving her side effects.  We will see how she does for the next month and if depression gets bad again we could try a different mood stabilizer         Follow Up:  Follow up in about 1 month (around 8/24/2024).  This TeleMed visit took place at the patient's home in [facility] and was seen between 9:30 a.m. and 9:45 a.m.

## 2024-07-25 ENCOUNTER — TELEPHONE (OUTPATIENT)
Dept: FAMILY MEDICINE | Facility: CLINIC | Age: 59
End: 2024-07-25
Payer: MEDICARE

## 2024-07-25 DIAGNOSIS — E53.8 B12 DEFICIENCY: ICD-10-CM

## 2024-07-25 DIAGNOSIS — K64.9 HEMORRHOIDS, UNSPECIFIED HEMORRHOID TYPE: Primary | ICD-10-CM

## 2024-07-25 RX ORDER — HYDROCORTISONE ACETATE 25 MG/1
25 SUPPOSITORY RECTAL 2 TIMES DAILY
Qty: 10 SUPPOSITORY | Refills: 0 | Status: SHIPPED | OUTPATIENT
Start: 2024-07-25 | End: 2024-07-30

## 2024-07-25 RX ORDER — CYANOCOBALAMIN 1000 UG/ML
1000 INJECTION, SOLUTION INTRAMUSCULAR; SUBCUTANEOUS
Qty: 1 ML | Refills: 3 | Status: SHIPPED | OUTPATIENT
Start: 2024-07-25

## 2024-07-29 ENCOUNTER — TELEPHONE (OUTPATIENT)
Dept: FAMILY MEDICINE | Facility: CLINIC | Age: 59
End: 2024-07-29
Payer: MEDICARE

## 2024-07-30 ENCOUNTER — TELEPHONE (OUTPATIENT)
Dept: FAMILY MEDICINE | Facility: CLINIC | Age: 59
End: 2024-07-30
Payer: MEDICARE

## 2024-08-05 ENCOUNTER — TELEPHONE (OUTPATIENT)
Dept: FAMILY MEDICINE | Facility: CLINIC | Age: 59
End: 2024-08-05
Payer: MEDICARE

## 2024-08-08 ENCOUNTER — TELEPHONE (OUTPATIENT)
Dept: FAMILY MEDICINE | Facility: CLINIC | Age: 59
End: 2024-08-08
Payer: MEDICARE

## 2024-08-08 DIAGNOSIS — I50.32 CHRONIC HEART FAILURE WITH PRESERVED EJECTION FRACTION: ICD-10-CM

## 2024-08-08 RX ORDER — TORSEMIDE 20 MG/1
40 TABLET ORAL 2 TIMES DAILY
Qty: 120 TABLET | Refills: 5 | Status: SHIPPED | OUTPATIENT
Start: 2024-08-08 | End: 2025-02-04

## 2024-08-12 ENCOUNTER — TELEPHONE (OUTPATIENT)
Dept: FAMILY MEDICINE | Facility: CLINIC | Age: 59
End: 2024-08-12
Payer: MEDICARE

## 2024-08-12 DIAGNOSIS — N18.30 STAGE 3 CHRONIC KIDNEY DISEASE, UNSPECIFIED WHETHER STAGE 3A OR 3B CKD: ICD-10-CM

## 2024-08-12 DIAGNOSIS — I10 BENIGN ESSENTIAL HYPERTENSION: Primary | ICD-10-CM

## 2024-08-12 DIAGNOSIS — G62.9 NEUROPATHY: ICD-10-CM

## 2024-08-12 RX ORDER — GABAPENTIN 600 MG/1
TABLET ORAL
Qty: 270 TABLET | Refills: 3 | Status: SHIPPED | OUTPATIENT
Start: 2024-08-12

## 2024-08-12 RX ORDER — DAPAGLIFLOZIN 10 MG/1
TABLET, FILM COATED ORAL
Qty: 90 TABLET | Refills: 3 | Status: SHIPPED | OUTPATIENT
Start: 2024-08-12

## 2024-08-12 RX ORDER — LOSARTAN POTASSIUM 100 MG/1
100 TABLET ORAL
Qty: 90 TABLET | Refills: 3 | Status: SHIPPED | OUTPATIENT
Start: 2024-08-12

## 2024-09-04 DIAGNOSIS — G89.4 CHRONIC PAIN SYNDROME: ICD-10-CM

## 2024-09-04 RX ORDER — HYDROCODONE BITARTRATE AND ACETAMINOPHEN 7.5; 325 MG/1; MG/1
1 TABLET ORAL EVERY 6 HOURS PRN
Qty: 120 TABLET | Refills: 0 | Status: SHIPPED | OUTPATIENT
Start: 2024-09-04

## 2024-09-26 ENCOUNTER — TELEPHONE (OUTPATIENT)
Dept: FAMILY MEDICINE | Facility: CLINIC | Age: 59
End: 2024-09-26
Payer: MEDICARE

## 2024-09-26 DIAGNOSIS — E11.40 TYPE 2 DIABETES MELLITUS WITH DIABETIC NEUROPATHY, WITHOUT LONG-TERM CURRENT USE OF INSULIN: ICD-10-CM

## 2024-09-26 DIAGNOSIS — R11.0 NAUSEA: ICD-10-CM

## 2024-09-26 RX ORDER — PROMETHAZINE HYDROCHLORIDE 25 MG/1
25 TABLET ORAL EVERY 6 HOURS PRN
Qty: 10 TABLET | Refills: 0 | Status: SHIPPED | OUTPATIENT
Start: 2024-09-26

## 2024-09-26 RX ORDER — SEMAGLUTIDE 2.68 MG/ML
2 INJECTION, SOLUTION SUBCUTANEOUS
Qty: 3 ML | Refills: 5 | Status: SHIPPED | OUTPATIENT
Start: 2024-09-26 | End: 2025-03-25

## 2024-09-30 DIAGNOSIS — G89.4 CHRONIC PAIN SYNDROME: ICD-10-CM

## 2024-10-02 RX ORDER — HYDROCODONE BITARTRATE AND ACETAMINOPHEN 7.5; 325 MG/1; MG/1
1 TABLET ORAL EVERY 6 HOURS PRN
Qty: 120 TABLET | Refills: 0 | Status: SHIPPED | OUTPATIENT
Start: 2024-10-02

## 2024-10-07 ENCOUNTER — TELEPHONE (OUTPATIENT)
Dept: FAMILY MEDICINE | Facility: CLINIC | Age: 59
End: 2024-10-07
Payer: MEDICARE

## 2024-10-07 DIAGNOSIS — G89.4 CHRONIC PAIN SYNDROME: ICD-10-CM

## 2024-10-07 DIAGNOSIS — J43.9 PULMONARY EMPHYSEMA, UNSPECIFIED EMPHYSEMA TYPE: ICD-10-CM

## 2024-10-07 DIAGNOSIS — E78.2 MIXED HYPERLIPIDEMIA: ICD-10-CM

## 2024-10-07 RX ORDER — AMITRIPTYLINE HYDROCHLORIDE 75 MG/1
75 TABLET ORAL NIGHTLY
Qty: 90 TABLET | Refills: 1 | Status: SHIPPED | OUTPATIENT
Start: 2024-10-07 | End: 2025-04-05

## 2024-10-07 RX ORDER — ROSUVASTATIN CALCIUM 10 MG/1
10 TABLET, COATED ORAL NIGHTLY
Qty: 90 TABLET | Refills: 1 | Status: SHIPPED | OUTPATIENT
Start: 2024-10-07 | End: 2025-04-05

## 2024-10-07 RX ORDER — ALBUTEROL SULFATE 90 UG/1
2 INHALANT RESPIRATORY (INHALATION) 4 TIMES DAILY
Qty: 6.7 G | Refills: 3 | Status: SHIPPED | OUTPATIENT
Start: 2024-10-07 | End: 2025-01-05

## 2024-10-21 ENCOUNTER — TELEPHONE (OUTPATIENT)
Dept: FAMILY MEDICINE | Facility: CLINIC | Age: 59
End: 2024-10-21
Payer: MEDICARE

## 2024-10-21 DIAGNOSIS — J43.9 PULMONARY EMPHYSEMA, UNSPECIFIED EMPHYSEMA TYPE: Primary | ICD-10-CM

## 2024-10-21 DIAGNOSIS — F32.9 MAJOR DEPRESSIVE DISORDER WITH CURRENT ACTIVE EPISODE, UNSPECIFIED DEPRESSION EPISODE SEVERITY, UNSPECIFIED WHETHER RECURRENT: ICD-10-CM

## 2024-10-21 RX ORDER — DULOXETIN HYDROCHLORIDE 60 MG/1
CAPSULE, DELAYED RELEASE ORAL
Qty: 30 CAPSULE | Refills: 3 | Status: SHIPPED | OUTPATIENT
Start: 2024-10-21

## 2024-10-21 RX ORDER — ALBUTEROL SULFATE 0.83 MG/ML
2.5 SOLUTION RESPIRATORY (INHALATION) EVERY 6 HOURS PRN
Qty: 120 EACH | Refills: 1 | Status: SHIPPED | OUTPATIENT
Start: 2024-10-21 | End: 2025-01-19

## 2024-10-28 ENCOUNTER — TELEPHONE (OUTPATIENT)
Dept: FAMILY MEDICINE | Facility: CLINIC | Age: 59
End: 2024-10-28
Payer: MEDICARE

## 2024-11-04 DIAGNOSIS — I10 BENIGN ESSENTIAL HYPERTENSION: ICD-10-CM

## 2024-11-04 DIAGNOSIS — G89.4 CHRONIC PAIN SYNDROME: ICD-10-CM

## 2024-11-04 RX ORDER — HYDROCODONE BITARTRATE AND ACETAMINOPHEN 7.5; 325 MG/1; MG/1
1 TABLET ORAL EVERY 6 HOURS PRN
Qty: 120 TABLET | Refills: 0 | Status: SHIPPED | OUTPATIENT
Start: 2024-11-04

## 2024-11-04 RX ORDER — CARVEDILOL 6.25 MG/1
6.25 TABLET ORAL 2 TIMES DAILY
Qty: 180 TABLET | Refills: 1 | Status: SHIPPED | OUTPATIENT
Start: 2024-11-04 | End: 2025-05-03

## 2024-11-04 NOTE — TELEPHONE ENCOUNTER
Carvedilol 6.25mg, BID   NORCO 7.5mg, Q6H      [facility]         Pt would like to speak to the nurse about some problems that she is having as well. Please advise.

## 2024-11-12 ENCOUNTER — TELEPHONE (OUTPATIENT)
Dept: FAMILY MEDICINE | Facility: CLINIC | Age: 59
End: 2024-11-12
Payer: MEDICARE

## 2024-11-18 ENCOUNTER — OFFICE VISIT (OUTPATIENT)
Dept: FAMILY MEDICINE | Facility: CLINIC | Age: 59
End: 2024-11-18
Payer: MEDICARE

## 2024-11-18 ENCOUNTER — PATIENT MESSAGE (OUTPATIENT)
Dept: FAMILY MEDICINE | Facility: CLINIC | Age: 59
End: 2024-11-18

## 2024-11-18 DIAGNOSIS — E11.40 TYPE 2 DIABETES MELLITUS WITH DIABETIC NEUROPATHY, WITHOUT LONG-TERM CURRENT USE OF INSULIN: ICD-10-CM

## 2024-11-18 DIAGNOSIS — G60.0 HEREDITARY SENSORIMOTOR NEUROPATHY: ICD-10-CM

## 2024-11-18 DIAGNOSIS — J43.9 PULMONARY EMPHYSEMA, UNSPECIFIED EMPHYSEMA TYPE: Primary | ICD-10-CM

## 2024-11-18 DIAGNOSIS — M21.962 ACQUIRED DEFORMITY OF BOTH FEET: ICD-10-CM

## 2024-11-18 DIAGNOSIS — G89.4 CHRONIC PAIN SYNDROME: ICD-10-CM

## 2024-11-18 DIAGNOSIS — F33.9 EPISODE OF RECURRENT MAJOR DEPRESSIVE DISORDER, UNSPECIFIED DEPRESSION EPISODE SEVERITY: ICD-10-CM

## 2024-11-18 DIAGNOSIS — M21.961 ACQUIRED DEFORMITY OF BOTH FEET: ICD-10-CM

## 2024-11-18 PROCEDURE — 99214 OFFICE O/P EST MOD 30 MIN: CPT | Mod: 95,,, | Performed by: FAMILY MEDICINE

## 2024-11-18 RX ORDER — HYDROCODONE BITARTRATE AND ACETAMINOPHEN 10; 325 MG/1; MG/1
1 TABLET ORAL EVERY 6 HOURS PRN
Qty: 120 TABLET | Refills: 0 | Status: SHIPPED | OUTPATIENT
Start: 2024-12-04 | End: 2025-01-03

## 2024-11-18 NOTE — PROGRESS NOTES
SUBJECTIVE:  Daya Timmons is a 59 y.o. female here for No chief complaint on file.      HPI  Patient being seen via TeleMed visit for follow-up on chronic conditions.  See assessment and plan for individual list of issues.  She has been having some problems with 1 of her feet.  Her toes are curling under when she walks on them this is causing a problem and some skin breakdown.  She has a history of severe neuropathy in her feet.  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  Depression has been a little worse lately but stable  Daya's allergies, medications, history, and problem list were updated as appropriate.    Review of Systems   See HPI  No results found for this or any previous visit (from the past 3 weeks).    OBJECTIVE:  Vital signs  There were no vitals filed for this visit.     Physical Exam this is a TeleMed visit.  Patient appeared to be in no distress.  Her in home sitter was with her for the visit    ASSESSMENT/PLAN:  1. Pulmonary emphysema, unspecified emphysema type  Stable Trelegy and duo nebs    2. Episode of recurrent major depressive disorder, unspecified depression episode severity  Duloxetine on Latuda    3. Chronic pain syndrome  She is on chronic hydrocodone therapy.  Recently we had to cut her pills back to the 7.5 mg because of a lack of access to the 10 mg tablets.  These are available again so we will increase back to 10 mg 4 times a day    4. Hereditary sensorimotor neuropathy      5. Type 2 diabetes mellitus with diabetic neuropathy, without long-term current use of insulin  GLP 1 agonist and metformin SGLT2 inhibitor  Overview:  Lab Results   Component Value Date    HGBA1C 6.2 (H) 01/09/2024    HGBA1C 6.1 (H) 07/27/2023    HGBA1C 6.3 (H) 04/26/2023           6. Acquired deformity of both feet  We will send her to Podiatry for evaluation as she may need some procedure to help straighten her toes to offload  -     Ambulatory  referral/consult to Podiatry; Future; Expected date: 11/25/2024         Follow Up:  No follow-ups on file.  This TeleMed visit took place between 10:00 a.m. and 10:15 a.m. at the patient's home in [facility]

## 2024-12-02 DIAGNOSIS — G60.0 HEREDITARY SENSORIMOTOR NEUROPATHY: ICD-10-CM

## 2024-12-02 DIAGNOSIS — G89.4 CHRONIC PAIN SYNDROME: ICD-10-CM

## 2024-12-02 RX ORDER — TRIPROLIDINE/PSEUDOEPHEDRINE 2.5MG-60MG
TABLET ORAL
Qty: 900 ML | Refills: 1 | Status: SHIPPED | OUTPATIENT
Start: 2024-12-02

## 2024-12-02 RX ORDER — HYDROCODONE BITARTRATE AND ACETAMINOPHEN 10; 325 MG/1; MG/1
1 TABLET ORAL EVERY 6 HOURS PRN
Qty: 120 TABLET | Refills: 0 | Status: SHIPPED | OUTPATIENT
Start: 2024-12-04 | End: 2025-01-03

## 2024-12-09 DIAGNOSIS — F32.9 MAJOR DEPRESSIVE DISORDER WITH CURRENT ACTIVE EPISODE, UNSPECIFIED DEPRESSION EPISODE SEVERITY, UNSPECIFIED WHETHER RECURRENT: ICD-10-CM

## 2024-12-09 RX ORDER — CLONAZEPAM 1 MG/1
1 TABLET ORAL 2 TIMES DAILY
Qty: 60 TABLET | Refills: 3 | Status: SHIPPED | OUTPATIENT
Start: 2024-12-09

## 2024-12-30 DIAGNOSIS — E53.8 B12 DEFICIENCY: ICD-10-CM

## 2024-12-30 DIAGNOSIS — G89.4 CHRONIC PAIN SYNDROME: ICD-10-CM

## 2024-12-30 DIAGNOSIS — G60.0 HEREDITARY SENSORIMOTOR NEUROPATHY: ICD-10-CM

## 2024-12-30 DIAGNOSIS — K21.9 GASTROESOPHAGEAL REFLUX DISEASE, UNSPECIFIED WHETHER ESOPHAGITIS PRESENT: ICD-10-CM

## 2024-12-30 RX ORDER — CYANOCOBALAMIN 1000 UG/ML
1000 INJECTION, SOLUTION INTRAMUSCULAR; SUBCUTANEOUS
Qty: 1 ML | Refills: 3 | Status: SHIPPED | OUTPATIENT
Start: 2024-12-30

## 2024-12-30 RX ORDER — HYDROCODONE BITARTRATE AND ACETAMINOPHEN 10; 325 MG/1; MG/1
1 TABLET ORAL EVERY 6 HOURS PRN
Qty: 120 TABLET | Refills: 0 | Status: SHIPPED | OUTPATIENT
Start: 2024-12-30 | End: 2025-01-29

## 2024-12-30 RX ORDER — PANTOPRAZOLE SODIUM 40 MG/1
TABLET, DELAYED RELEASE ORAL
Qty: 90 TABLET | Refills: 1 | Status: SHIPPED | OUTPATIENT
Start: 2024-12-30

## 2025-01-14 DIAGNOSIS — N18.30 DIABETES MELLITUS DUE TO UNDERLYING CONDITION WITH STAGE 3 CHRONIC KIDNEY DISEASE, WITHOUT LONG-TERM CURRENT USE OF INSULIN, UNSPECIFIED WHETHER STAGE 3A OR 3B CKD: ICD-10-CM

## 2025-01-14 DIAGNOSIS — E08.22 DIABETES MELLITUS DUE TO UNDERLYING CONDITION WITH STAGE 3 CHRONIC KIDNEY DISEASE, WITHOUT LONG-TERM CURRENT USE OF INSULIN, UNSPECIFIED WHETHER STAGE 3A OR 3B CKD: ICD-10-CM

## 2025-01-14 RX ORDER — BLOOD-GLUCOSE CONTROL, NORMAL
EACH MISCELLANEOUS
Qty: 100 STRIP | Refills: 6 | Status: SHIPPED | OUTPATIENT
Start: 2025-01-14

## 2025-01-24 ENCOUNTER — TELEPHONE (OUTPATIENT)
Dept: FAMILY MEDICINE | Facility: CLINIC | Age: 60
End: 2025-01-24
Payer: MEDICARE

## 2025-01-24 DIAGNOSIS — J43.9 PULMONARY EMPHYSEMA, UNSPECIFIED EMPHYSEMA TYPE: ICD-10-CM

## 2025-01-24 RX ORDER — ALBUTEROL SULFATE 90 UG/1
2 INHALANT RESPIRATORY (INHALATION) 4 TIMES DAILY
Qty: 8.5 G | Refills: 2 | Status: SHIPPED | OUTPATIENT
Start: 2025-01-24 | End: 2025-04-24

## 2025-01-27 DIAGNOSIS — E11.22 TYPE 2 DIABETES MELLITUS WITH STAGE 3A CHRONIC KIDNEY DISEASE, WITHOUT LONG-TERM CURRENT USE OF INSULIN: ICD-10-CM

## 2025-01-27 DIAGNOSIS — N18.31 TYPE 2 DIABETES MELLITUS WITH STAGE 3A CHRONIC KIDNEY DISEASE, WITHOUT LONG-TERM CURRENT USE OF INSULIN: ICD-10-CM

## 2025-01-27 DIAGNOSIS — G89.4 CHRONIC PAIN SYNDROME: ICD-10-CM

## 2025-01-27 DIAGNOSIS — K21.9 GASTROESOPHAGEAL REFLUX DISEASE, UNSPECIFIED WHETHER ESOPHAGITIS PRESENT: ICD-10-CM

## 2025-01-27 DIAGNOSIS — G60.0 HEREDITARY SENSORIMOTOR NEUROPATHY: ICD-10-CM

## 2025-01-27 RX ORDER — METFORMIN HYDROCHLORIDE 1000 MG/1
TABLET ORAL
Qty: 180 TABLET | Refills: 1 | Status: SHIPPED | OUTPATIENT
Start: 2025-01-27

## 2025-01-27 RX ORDER — FAMOTIDINE 20 MG/1
TABLET, FILM COATED ORAL
Qty: 180 TABLET | Refills: 1 | Status: SHIPPED | OUTPATIENT
Start: 2025-01-27

## 2025-01-27 RX ORDER — HYDROCODONE BITARTRATE AND ACETAMINOPHEN 10; 325 MG/1; MG/1
1 TABLET ORAL EVERY 6 HOURS PRN
Qty: 120 TABLET | Refills: 0 | Status: SHIPPED | OUTPATIENT
Start: 2025-01-27 | End: 2025-02-26

## 2025-02-14 ENCOUNTER — TELEPHONE (OUTPATIENT)
Dept: FAMILY MEDICINE | Facility: CLINIC | Age: 60
End: 2025-02-14
Payer: MEDICARE

## 2025-02-14 DIAGNOSIS — I50.32 CHRONIC HEART FAILURE WITH PRESERVED EJECTION FRACTION: ICD-10-CM

## 2025-02-14 DIAGNOSIS — G62.9 NEUROPATHY: ICD-10-CM

## 2025-02-14 DIAGNOSIS — F32.9 MAJOR DEPRESSIVE DISORDER WITH CURRENT ACTIVE EPISODE, UNSPECIFIED DEPRESSION EPISODE SEVERITY, UNSPECIFIED WHETHER RECURRENT: ICD-10-CM

## 2025-02-14 RX ORDER — GABAPENTIN 600 MG/1
600 TABLET ORAL 3 TIMES DAILY
Qty: 270 TABLET | Refills: 3 | Status: SHIPPED | OUTPATIENT
Start: 2025-02-14

## 2025-02-14 RX ORDER — DULOXETIN HYDROCHLORIDE 60 MG/1
CAPSULE, DELAYED RELEASE ORAL
Qty: 30 CAPSULE | Refills: 3 | Status: SHIPPED | OUTPATIENT
Start: 2025-02-14

## 2025-02-14 RX ORDER — TORSEMIDE 20 MG/1
40 TABLET ORAL 2 TIMES DAILY
Qty: 120 TABLET | Refills: 5 | Status: SHIPPED | OUTPATIENT
Start: 2025-02-14

## 2025-02-18 ENCOUNTER — TELEPHONE (OUTPATIENT)
Dept: FAMILY MEDICINE | Facility: CLINIC | Age: 60
End: 2025-02-18
Payer: MEDICARE

## 2025-02-19 ENCOUNTER — TELEPHONE (OUTPATIENT)
Dept: FAMILY MEDICINE | Facility: CLINIC | Age: 60
End: 2025-02-19
Payer: MEDICARE

## 2025-02-19 DIAGNOSIS — R11.0 NAUSEA: ICD-10-CM

## 2025-02-19 RX ORDER — PROMETHAZINE HYDROCHLORIDE 25 MG/1
25 TABLET ORAL EVERY 6 HOURS PRN
Qty: 10 TABLET | Refills: 0 | Status: SHIPPED | OUTPATIENT
Start: 2025-02-19

## 2025-02-25 DIAGNOSIS — G89.4 CHRONIC PAIN SYNDROME: ICD-10-CM

## 2025-02-25 DIAGNOSIS — G60.0 HEREDITARY SENSORIMOTOR NEUROPATHY: ICD-10-CM

## 2025-02-25 RX ORDER — HYDROCODONE BITARTRATE AND ACETAMINOPHEN 10; 325 MG/1; MG/1
1 TABLET ORAL EVERY 6 HOURS PRN
Qty: 120 TABLET | Refills: 0 | Status: SHIPPED | OUTPATIENT
Start: 2025-02-25 | End: 2025-03-27

## 2025-03-13 ENCOUNTER — TELEPHONE (OUTPATIENT)
Dept: FAMILY MEDICINE | Facility: CLINIC | Age: 60
End: 2025-03-13
Payer: MEDICARE

## 2025-03-17 DIAGNOSIS — E11.40 TYPE 2 DIABETES MELLITUS WITH DIABETIC NEUROPATHY, WITHOUT LONG-TERM CURRENT USE OF INSULIN: ICD-10-CM

## 2025-03-17 RX ORDER — SEMAGLUTIDE 2.68 MG/ML
2 INJECTION, SOLUTION SUBCUTANEOUS
Qty: 3 ML | Refills: 5 | Status: SHIPPED | OUTPATIENT
Start: 2025-03-17 | End: 2025-09-13

## 2025-03-31 DIAGNOSIS — G89.4 CHRONIC PAIN SYNDROME: ICD-10-CM

## 2025-03-31 DIAGNOSIS — G60.0 HEREDITARY SENSORIMOTOR NEUROPATHY: ICD-10-CM

## 2025-03-31 RX ORDER — HYDROCODONE BITARTRATE AND ACETAMINOPHEN 10; 325 MG/1; MG/1
1 TABLET ORAL EVERY 6 HOURS PRN
Qty: 120 TABLET | Refills: 0 | Status: SHIPPED | OUTPATIENT
Start: 2025-03-31 | End: 2025-04-30

## 2025-04-09 ENCOUNTER — TELEPHONE (OUTPATIENT)
Dept: FAMILY MEDICINE | Facility: CLINIC | Age: 60
End: 2025-04-09
Payer: MEDICARE

## 2025-04-09 DIAGNOSIS — F32.9 MAJOR DEPRESSIVE DISORDER WITH CURRENT ACTIVE EPISODE, UNSPECIFIED DEPRESSION EPISODE SEVERITY, UNSPECIFIED WHETHER RECURRENT: ICD-10-CM

## 2025-04-09 DIAGNOSIS — G89.4 CHRONIC PAIN SYNDROME: ICD-10-CM

## 2025-04-09 RX ORDER — TRIPROLIDINE/PSEUDOEPHEDRINE 2.5MG-60MG
TABLET ORAL
Qty: 900 ML | Refills: 1 | Status: SHIPPED | OUTPATIENT
Start: 2025-04-09

## 2025-04-10 RX ORDER — CLONAZEPAM 1 MG/1
TABLET ORAL
Qty: 60 TABLET | Refills: 3 | Status: SHIPPED | OUTPATIENT
Start: 2025-04-10

## 2025-04-22 ENCOUNTER — TELEPHONE (OUTPATIENT)
Dept: FAMILY MEDICINE | Facility: CLINIC | Age: 60
End: 2025-04-22
Payer: MEDICARE

## 2025-04-29 ENCOUNTER — TELEPHONE (OUTPATIENT)
Dept: FAMILY MEDICINE | Facility: CLINIC | Age: 60
End: 2025-04-29
Payer: MEDICARE

## 2025-04-29 DIAGNOSIS — I10 BENIGN ESSENTIAL HYPERTENSION: ICD-10-CM

## 2025-04-29 RX ORDER — CARVEDILOL 6.25 MG/1
6.25 TABLET ORAL 2 TIMES DAILY
Qty: 180 TABLET | Refills: 1 | Status: SHIPPED | OUTPATIENT
Start: 2025-04-29 | End: 2025-10-26

## 2025-05-27 DIAGNOSIS — G60.0 HEREDITARY SENSORIMOTOR NEUROPATHY: ICD-10-CM

## 2025-05-27 DIAGNOSIS — G89.4 CHRONIC PAIN SYNDROME: ICD-10-CM

## 2025-05-27 RX ORDER — HYDROCODONE BITARTRATE AND ACETAMINOPHEN 10; 325 MG/1; MG/1
1 TABLET ORAL EVERY 6 HOURS PRN
Qty: 120 TABLET | Refills: 0 | Status: SHIPPED | OUTPATIENT
Start: 2025-05-27 | End: 2025-06-26

## 2025-06-13 ENCOUNTER — TELEPHONE (OUTPATIENT)
Dept: FAMILY MEDICINE | Facility: CLINIC | Age: 60
End: 2025-06-13
Payer: MEDICARE

## 2025-06-13 DIAGNOSIS — E53.8 B12 DEFICIENCY: ICD-10-CM

## 2025-06-13 DIAGNOSIS — F32.9 MAJOR DEPRESSIVE DISORDER WITH CURRENT ACTIVE EPISODE, UNSPECIFIED DEPRESSION EPISODE SEVERITY, UNSPECIFIED WHETHER RECURRENT: ICD-10-CM

## 2025-06-13 DIAGNOSIS — H10.9 CONJUNCTIVITIS, UNSPECIFIED CONJUNCTIVITIS TYPE, UNSPECIFIED LATERALITY: Primary | ICD-10-CM

## 2025-06-13 RX ORDER — DULOXETIN HYDROCHLORIDE 60 MG/1
CAPSULE, DELAYED RELEASE ORAL
Qty: 30 CAPSULE | Refills: 3 | Status: SHIPPED | OUTPATIENT
Start: 2025-06-13

## 2025-06-13 RX ORDER — MOXIFLOXACIN 5 MG/ML
1 SOLUTION/ DROPS OPHTHALMIC 3 TIMES DAILY
Qty: 3 ML | Refills: 0 | Status: SHIPPED | OUTPATIENT
Start: 2025-06-13 | End: 2025-06-20

## 2025-06-13 RX ORDER — CYANOCOBALAMIN 1000 UG/ML
INJECTION, SOLUTION INTRAMUSCULAR; SUBCUTANEOUS
Qty: 1 ML | Refills: 3 | Status: SHIPPED | OUTPATIENT
Start: 2025-06-13

## 2025-06-18 ENCOUNTER — TELEPHONE (OUTPATIENT)
Dept: FAMILY MEDICINE | Facility: CLINIC | Age: 60
End: 2025-06-18
Payer: MEDICARE

## 2025-06-19 ENCOUNTER — TELEPHONE (OUTPATIENT)
Dept: FAMILY MEDICINE | Facility: CLINIC | Age: 60
End: 2025-06-19
Payer: MEDICARE

## 2025-06-19 DIAGNOSIS — I10 BENIGN ESSENTIAL HYPERTENSION: ICD-10-CM

## 2025-06-19 RX ORDER — ISOSORBIDE MONONITRATE 30 MG/1
30 TABLET, EXTENDED RELEASE ORAL DAILY
Qty: 30 TABLET | Refills: 3 | Status: SHIPPED | OUTPATIENT
Start: 2025-06-19 | End: 2025-10-17

## 2025-06-23 ENCOUNTER — TELEPHONE (OUTPATIENT)
Dept: FAMILY MEDICINE | Facility: CLINIC | Age: 60
End: 2025-06-23
Payer: MEDICARE

## 2025-06-23 DIAGNOSIS — I20.9 ANGINA PECTORIS: ICD-10-CM

## 2025-06-23 RX ORDER — POTASSIUM CHLORIDE 20 MEQ/1
TABLET, EXTENDED RELEASE ORAL
Qty: 90 TABLET | Refills: 3 | Status: SHIPPED | OUTPATIENT
Start: 2025-06-23

## 2025-06-23 NOTE — TELEPHONE ENCOUNTER
Pt said she has been having left hip pain going into her groin.  She said she has fallen several times.  Dr. Rodriguez said we can order a hip xray- I called Dilma with [facility]

## 2025-06-25 ENCOUNTER — TELEPHONE (OUTPATIENT)
Dept: FAMILY MEDICINE | Facility: CLINIC | Age: 60
End: 2025-06-25
Payer: MEDICARE

## 2025-06-25 DIAGNOSIS — G60.0 HEREDITARY SENSORIMOTOR NEUROPATHY: ICD-10-CM

## 2025-06-25 DIAGNOSIS — G89.4 CHRONIC PAIN SYNDROME: ICD-10-CM

## 2025-06-25 RX ORDER — HYDROCODONE BITARTRATE AND ACETAMINOPHEN 10; 325 MG/1; MG/1
1 TABLET ORAL EVERY 6 HOURS PRN
Qty: 120 TABLET | Refills: 0 | Status: SHIPPED | OUTPATIENT
Start: 2025-06-25 | End: 2025-07-25

## 2025-06-26 ENCOUNTER — TELEPHONE (OUTPATIENT)
Dept: FAMILY MEDICINE | Facility: CLINIC | Age: 60
End: 2025-06-26
Payer: MEDICARE

## 2025-06-26 NOTE — TELEPHONE ENCOUNTER
Notified pt that her x-ray of the hip showed no fractures.  She did have arthritis. Verbalized understanding.

## 2025-06-27 ENCOUNTER — LAB REQUISITION (OUTPATIENT)
Dept: LAB | Facility: HOSPITAL | Age: 60
End: 2025-06-27
Payer: MEDICARE

## 2025-06-27 DIAGNOSIS — I50.32 CHRONIC DIASTOLIC (CONGESTIVE) HEART FAILURE: ICD-10-CM

## 2025-06-27 DIAGNOSIS — E11.40 TYPE 2 DIABETES MELLITUS WITH DIABETIC NEUROPATHY, UNSPECIFIED: ICD-10-CM

## 2025-06-27 DIAGNOSIS — N18.31 CHRONIC KIDNEY DISEASE, STAGE 3A: ICD-10-CM

## 2025-06-27 DIAGNOSIS — I25.119 ATHEROSCLEROTIC HEART DISEASE OF NATIVE CORONARY ARTERY WITH UNSPECIFIED ANGINA PECTORIS: ICD-10-CM

## 2025-06-27 DIAGNOSIS — I11.0 HYPERTENSIVE HEART DISEASE WITH HEART FAILURE: ICD-10-CM

## 2025-06-27 LAB
ALBUMIN SERPL-MCNC: 4.4 G/DL (ref 3.4–5)
ALBUMIN/GLOB SERPL: 1.7 RATIO
ALP SERPL-CCNC: 65 UNIT/L (ref 50–144)
ALT SERPL-CCNC: 52 UNIT/L (ref 1–45)
ANION GAP SERPL CALC-SCNC: 8 MEQ/L (ref 2–13)
AST SERPL-CCNC: 45 UNIT/L (ref 14–36)
BASOPHILS # BLD AUTO: 0.06 X10(3)/MCL (ref 0.01–0.08)
BASOPHILS NFR BLD AUTO: 0.7 % (ref 0.1–1.2)
BILIRUB SERPL-MCNC: 0.4 MG/DL (ref 0–1)
BUN SERPL-MCNC: 16 MG/DL (ref 7–20)
CALCIUM SERPL-MCNC: 8.8 MG/DL (ref 8.4–10.2)
CHLORIDE SERPL-SCNC: 99 MMOL/L (ref 98–110)
CHOLEST SERPL-MCNC: 112 MG/DL (ref 0–200)
CO2 SERPL-SCNC: 30 MMOL/L (ref 21–32)
CREAT SERPL-MCNC: 0.82 MG/DL (ref 0.66–1.25)
CREAT/UREA NIT SERPL: 20 (ref 12–20)
EOSINOPHIL # BLD AUTO: 0.24 X10(3)/MCL (ref 0.04–0.36)
EOSINOPHIL NFR BLD AUTO: 2.9 % (ref 0.7–7)
ERYTHROCYTE [DISTWIDTH] IN BLOOD BY AUTOMATED COUNT: 15.8 % (ref 11–14.5)
EST. AVERAGE GLUCOSE BLD GHB EST-MCNC: 125.5 MG/DL (ref 70–115)
FERRITIN SERPL-MCNC: 24.6 NG/ML (ref 6.24–264)
GFR SERPLBLD CREATININE-BSD FMLA CKD-EPI: 82 ML/MIN/1.73/M2
GLOBULIN SER-MCNC: 2.6 GM/DL (ref 2–3.9)
GLUCOSE SERPL-MCNC: 84 MG/DL (ref 70–115)
HBA1C MFR BLD: 6 % (ref 4–6)
HCT VFR BLD AUTO: 41 % (ref 36–48)
HDLC SERPL-MCNC: 47 MG/DL (ref 40–60)
HGB BLD-MCNC: 12.7 G/DL (ref 11.8–16)
IMM GRANULOCYTES # BLD AUTO: 0.01 X10(3)/MCL (ref 0–0.03)
IMM GRANULOCYTES NFR BLD AUTO: 0.1 % (ref 0–0.5)
IRON SATN MFR SERPL: 10 % (ref 20–50)
IRON SERPL-MCNC: 34 UG/DL (ref 50–170)
LDLC SERPL DIRECT ASSAY-SCNC: <30 MG/DL (ref 30–100)
LYMPHOCYTES # BLD AUTO: 2.29 X10(3)/MCL (ref 1.16–3.74)
LYMPHOCYTES NFR BLD AUTO: 28 % (ref 20–55)
MCH RBC QN AUTO: 26.2 PG (ref 27–34)
MCHC RBC AUTO-ENTMCNC: 31 G/DL (ref 31–37)
MCV RBC AUTO: 84.7 FL (ref 79–99)
MONOCYTES # BLD AUTO: 0.37 X10(3)/MCL (ref 0.24–0.36)
MONOCYTES NFR BLD AUTO: 4.5 % (ref 4.7–12.5)
NEUTROPHILS # BLD AUTO: 5.21 X10(3)/MCL (ref 1.56–6.13)
NEUTROPHILS NFR BLD AUTO: 63.8 % (ref 37–73)
PLATELET # BLD AUTO: 243 X10(3)/MCL (ref 140–371)
PMV BLD AUTO: 10 FL (ref 9.4–12.4)
POTASSIUM SERPL-SCNC: 3.8 MMOL/L (ref 3.5–5.1)
PROT SERPL-MCNC: 7 GM/DL (ref 6.3–8.2)
RBC # BLD AUTO: 4.84 X10(6)/MCL (ref 4–5.1)
SODIUM SERPL-SCNC: 137 MMOL/L (ref 136–145)
TIBC SERPL-MCNC: 290 UG/DL (ref 70–310)
TIBC SERPL-MCNC: 324 UG/DL (ref 250–450)
TRANSFERRIN SERPL-MCNC: 307 MG/DL (ref 180–382)
TRIGL SERPL-MCNC: 177 MG/DL (ref 30–200)
TSH SERPL-ACNC: 2.52 UIU/ML (ref 0.36–3.74)
WBC # BLD AUTO: 8.18 X10(3)/MCL (ref 4–11.5)

## 2025-06-27 PROCEDURE — 83036 HEMOGLOBIN GLYCOSYLATED A1C: CPT | Performed by: FAMILY MEDICINE

## 2025-06-27 PROCEDURE — 80061 LIPID PANEL: CPT | Performed by: FAMILY MEDICINE

## 2025-06-27 PROCEDURE — 85025 COMPLETE CBC W/AUTO DIFF WBC: CPT | Performed by: FAMILY MEDICINE

## 2025-06-27 PROCEDURE — 80053 COMPREHEN METABOLIC PANEL: CPT | Performed by: FAMILY MEDICINE

## 2025-06-27 PROCEDURE — 82728 ASSAY OF FERRITIN: CPT | Performed by: FAMILY MEDICINE

## 2025-06-27 PROCEDURE — 84443 ASSAY THYROID STIM HORMONE: CPT | Performed by: FAMILY MEDICINE

## 2025-06-27 PROCEDURE — 83550 IRON BINDING TEST: CPT | Performed by: FAMILY MEDICINE

## 2025-07-01 ENCOUNTER — OFFICE VISIT (OUTPATIENT)
Dept: FAMILY MEDICINE | Facility: CLINIC | Age: 60
End: 2025-07-01
Payer: MEDICARE

## 2025-07-01 VITALS
HEIGHT: 68 IN | TEMPERATURE: 99 F | DIASTOLIC BLOOD PRESSURE: 84 MMHG | WEIGHT: 293 LBS | BODY MASS INDEX: 44.41 KG/M2 | HEART RATE: 77 BPM | SYSTOLIC BLOOD PRESSURE: 124 MMHG | OXYGEN SATURATION: 92 %

## 2025-07-01 DIAGNOSIS — G89.4 CHRONIC PAIN SYNDROME: ICD-10-CM

## 2025-07-01 DIAGNOSIS — M25.552 LEFT HIP PAIN: ICD-10-CM

## 2025-07-01 DIAGNOSIS — R54 FRAILTY: ICD-10-CM

## 2025-07-01 DIAGNOSIS — F17.200 VAPING NICOTINE DEPENDENCE, NON-TOBACCO PRODUCT: ICD-10-CM

## 2025-07-01 DIAGNOSIS — E66.01 CLASS 3 SEVERE OBESITY DUE TO EXCESS CALORIES WITH SERIOUS COMORBIDITY AND BODY MASS INDEX (BMI) OF 50.0 TO 59.9 IN ADULT: ICD-10-CM

## 2025-07-01 DIAGNOSIS — N18.30 STAGE 3 CHRONIC KIDNEY DISEASE, UNSPECIFIED WHETHER STAGE 3A OR 3B CKD: ICD-10-CM

## 2025-07-01 DIAGNOSIS — E11.40 TYPE 2 DIABETES MELLITUS WITH DIABETIC NEUROPATHY, WITHOUT LONG-TERM CURRENT USE OF INSULIN: ICD-10-CM

## 2025-07-01 DIAGNOSIS — Z00.00 MEDICARE ANNUAL WELLNESS VISIT, SUBSEQUENT: ICD-10-CM

## 2025-07-01 DIAGNOSIS — J43.9 PULMONARY EMPHYSEMA, UNSPECIFIED EMPHYSEMA TYPE: ICD-10-CM

## 2025-07-01 DIAGNOSIS — G47.33 OBSTRUCTIVE SLEEP APNEA SYNDROME: ICD-10-CM

## 2025-07-01 DIAGNOSIS — G60.0 HEREDITARY SENSORIMOTOR NEUROPATHY: Primary | ICD-10-CM

## 2025-07-01 DIAGNOSIS — E66.813 CLASS 3 SEVERE OBESITY DUE TO EXCESS CALORIES WITH SERIOUS COMORBIDITY AND BODY MASS INDEX (BMI) OF 50.0 TO 59.9 IN ADULT: ICD-10-CM

## 2025-07-01 LAB
ALBUMIN/CREAT UR: ABNORMAL
CREAT UR-MCNC: 8.7 MG/DL (ref 45–106)
MICROALBUMIN UR-MCNC: <5 UG/ML

## 2025-07-01 PROCEDURE — 99214 OFFICE O/P EST MOD 30 MIN: CPT | Mod: ,,, | Performed by: FAMILY MEDICINE

## 2025-07-01 PROCEDURE — G0439 PPPS, SUBSEQ VISIT: HCPCS | Mod: ,,, | Performed by: FAMILY MEDICINE

## 2025-07-01 PROCEDURE — 82570 ASSAY OF URINE CREATININE: CPT | Performed by: FAMILY MEDICINE

## 2025-07-01 PROCEDURE — 96372 THER/PROPH/DIAG INJ SC/IM: CPT | Mod: ,,, | Performed by: FAMILY MEDICINE

## 2025-07-01 RX ORDER — DEXAMETHASONE SODIUM PHOSPHATE 10 MG/ML
10 INJECTION INTRAMUSCULAR; INTRAVENOUS
Status: COMPLETED | OUTPATIENT
Start: 2025-07-01 | End: 2025-07-01

## 2025-07-01 RX ORDER — MOXIFLOXACIN 5 MG/ML
1 SOLUTION/ DROPS OPHTHALMIC 2 TIMES DAILY PRN
COMMUNITY

## 2025-07-01 RX ADMIN — DEXAMETHASONE SODIUM PHOSPHATE 10 MG: 10 INJECTION INTRAMUSCULAR; INTRAVENOUS at 09:07

## 2025-07-01 NOTE — PROGRESS NOTES
SUBJECTIVE:  Daya Timmons is a 60 y.o. female here for Otalgia (Bilateral)      Otalgia   Pertinent negatives include no abdominal pain, coughing, headaches, hearing loss, rash or sore throat.     Patient here for annual Medicare wellness along with follow-up.  She has multiple chronic diseases.  She has severe neuropathy in his now pretty much bed-bound and wheelchair-bound.  She has been having some severe pain in his left hip since having a fall a few weeks ago.  We had x-rays done at home which were negative other than arthritic changes.  Pain is very severe in the groin and buttocks area anytime she tries moving.  Daya's allergies, medications, history, and problem list were updated as appropriate.    Review of Systems   Constitutional:  Negative for activity change, appetite change, fatigue and fever.   HENT:  Positive for ear pain. Negative for congestion, hearing loss, sore throat and trouble swallowing.    Eyes:  Negative for photophobia, pain, redness and visual disturbance.   Respiratory:  Negative for cough, chest tightness, shortness of breath and wheezing.    Cardiovascular:  Negative for chest pain, palpitations and leg swelling.   Gastrointestinal:  Negative for abdominal distention, abdominal pain and blood in stool.   Endocrine: Negative for cold intolerance, heat intolerance, polydipsia and polyuria.   Genitourinary:  Negative for difficulty urinating, dysuria and frequency.   Musculoskeletal:  Positive for arthralgias, back pain, gait problem and myalgias. Negative for joint swelling.   Skin:  Negative for color change, pallor and rash.   Allergic/Immunologic: Negative.    Neurological:  Positive for weakness. Negative for dizziness, seizures, speech difficulty and headaches.   Hematological:  Negative for adenopathy. Does not bruise/bleed easily.   Psychiatric/Behavioral:  Negative for agitation and confusion.       A comprehensive review of symptoms was completed and negative except as noted  above.    Recent Results (from the past 3 weeks)   TSH    Collection Time: 06/27/25 10:19 AM   Result Value Ref Range    TSH 2.520 0.360 - 3.740 uIU/mL   Comprehensive Metabolic Panel    Collection Time: 06/27/25 10:19 AM   Result Value Ref Range    Sodium 137 136 - 145 mmol/L    Potassium 3.8 3.5 - 5.1 mmol/L    Chloride 99 98 - 110 mmol/L    CO2 30 21 - 32 mmol/L    Glucose 84 70 - 115 mg/dL    Blood Urea Nitrogen 16 7.0 - 20.0 mg/dL    Creatinine 0.82 0.66 - 1.25 mg/dL    Calcium 8.8 8.4 - 10.2 mg/dL    Protein Total 7.0 6.3 - 8.2 gm/dL    Albumin 4.4 3.4 - 5.0 g/dL    Globulin 2.6 2.0 - 3.9 gm/dL    Albumin/Globulin Ratio 1.7 ratio    Bilirubin Total 0.4 0.0 - 1.0 mg/dL    ALP 65 50 - 144 unit/L    ALT 52 (H) 1 - 45 unit/L    AST 45 (H) 14 - 36 unit/L    eGFR 82 mL/min/1.73/m2    Anion Gap 8.0 2.0 - 13.0 mEq/L    BUN/Creatinine Ratio 20 12 - 20   Iron and TIBC    Collection Time: 06/27/25 10:19 AM   Result Value Ref Range    Iron Binding Capacity Unsaturated 290 70 - 310 ug/dL    Iron Level 34 (L) 50 - 170 ug/dL    Transferrin 307 180 - 382 mg/dL    Iron Binding Capacity Total 324 250 - 450 ug/dL    Iron Saturation 10 (L) 20 - 50 %   Ferritin    Collection Time: 06/27/25 10:19 AM   Result Value Ref Range    Ferritin Level 24.6 6.24 - 264.00 ng/mL   Lipid Panel    Collection Time: 06/27/25 10:19 AM   Result Value Ref Range    Cholesterol Total 112 0 - 200 mg/dL    HDL Cholesterol 47 40 - 60 mg/dL    Triglyceride 177 30 - 200 mg/dL    LDL Cholesterol Direct <30.0 (L) 30.0 - 100.0 mg/dL   Hemoglobin A1C    Collection Time: 06/27/25 10:19 AM   Result Value Ref Range    Hemoglobin A1c 6.0 4.0 - 6.0 %    Estimated Average Glucose 125.5 (H) 70.0 - 115.0 mg/dL   CBC with Differential    Collection Time: 06/27/25 10:19 AM   Result Value Ref Range    WBC 8.18 4.00 - 11.50 x10(3)/mcL    RBC 4.84 4.00 - 5.10 x10(6)/mcL    Hgb 12.7 11.8 - 16.0 g/dL    Hct 41.0 36.0 - 48.0 %    MCV 84.7 79.0 - 99.0 fL    MCH 26.2 (L) 27.0 -  "34.0 pg    MCHC 31.0 31.0 - 37.0 g/dL    RDW 15.8 (H) 11.0 - 14.5 %    Platelet 243 140 - 371 x10(3)/mcL    MPV 10.0 9.4 - 12.4 fL    Neut % 63.8 37 - 73 %    Lymph % 28.0 20 - 55 %    Mono % 4.5 (L) 4.7 - 12.5 %    Eos % 2.9 0.7 - 7 %    Basophil % 0.7 0.1 - 1.2 %    Lymph # 2.29 1.16 - 3.74 x10(3)/mcL    Neut # 5.21 1.56 - 6.13 x10(3)/mcL    Mono # 0.37 (H) 0.24 - 0.36 x10(3)/mcL    Eos # 0.24 0.04 - 0.36 x10(3)/mcL    Baso # 0.06 0.01 - 0.08 x10(3)/mcL    Imm Gran # 0.01 0.00 - 0.03 x10(3)/mcL    Imm Grans % 0.1 0 - 0.5 %       OBJECTIVE:  Vital signs  Vitals:    07/01/25 0846   BP: 124/84   BP Location: Left arm   Patient Position: Sitting   Pulse: 77   Temp: 99.2 °F (37.3 °C)   TempSrc: Oral   SpO2: (!) 92%   Weight: 133.4 kg (294 lb 3.2 oz)   Height: 5' 7.72" (1.72 m)        Physical Exam  Constitutional:       Appearance: Normal appearance. She is obese.   HENT:      Head: Normocephalic and atraumatic.      Right Ear: External ear normal.      Left Ear: External ear normal.      Nose: Nose normal.      Mouth/Throat:      Mouth: Mucous membranes are moist.      Pharynx: Oropharynx is clear.   Eyes:      Extraocular Movements: Extraocular movements intact.      Conjunctiva/sclera: Conjunctivae normal.      Pupils: Pupils are equal, round, and reactive to light.   Cardiovascular:      Rate and Rhythm: Normal rate and regular rhythm.      Heart sounds: Normal heart sounds. No murmur heard.  Pulmonary:      Effort: Pulmonary effort is normal.      Breath sounds: Normal breath sounds. No wheezing or rhonchi.      Comments: Lungs diminished throughout  Abdominal:      General: Abdomen is flat.      Palpations: Abdomen is soft.   Musculoskeletal:      Cervical back: Normal range of motion and neck supple.      Comments: Pain in the left hip with internal rotation external rotation or flexion   Skin:     General: Skin is warm and dry.   Neurological:      General: No focal deficit present.      Mental Status: She is " alert and oriented to person, place, and time.   Psychiatric:         Mood and Affect: Mood normal.         Behavior: Behavior normal.         Thought Content: Thought content normal.         Judgment: Judgment normal.          ASSESSMENT/PLAN:  1. Hereditary sensorimotor neuropathy  Patient has a hospital bed at home but it is breaking.  She needs a prescription for a new hospital bed.  She requires the hospital bed for positioning to help with her pain in her hips knees and back.  She also has COPD as well as sleep apnea and needs to be able to elevate the head of her bed.  -     HOSPITAL BED FOR HOME USE    2. Chronic pain syndrome  She is on chronic hydrocodone therapy.   report was reviewed    3. Pulmonary emphysema, unspecified emphysema type  She uses albuterol on an as-needed basis.  Symptoms has been better since she quit smoking cigarettes in his now only vaping    4. Stage 3 chronic kidney disease, unspecified whether stage 3a or 3b CKD  Creatinine levels improved as of late.  She is on ARB and SGLT2 inhibitor therapy  Lab Results   Component Value Date    CREATININE 0.82 06/27/2025          Orders:  -     Microalbumin/Creatinine Ratio, Urine; Future; Expected date: 10/01/2025    5. Class 3 severe obesity due to excess calories with serious comorbidity and body mass index (BMI) of 50.0 to 59.9 in adult    -     HOSPITAL BED FOR HOME USE    6. Type 2 diabetes mellitus with diabetic neuropathy, without long-term current use of insulin  On metformin and Ozempic  Overview:  Lab Results   Component Value Date    HGBA1C 6.0 06/27/2025    HGBA1C 7.6 (H) 07/18/2024    HGBA1C 8.5 (H) 05/09/2024         Orders:  -     Microalbumin/Creatinine Ratio, Urine; Future; Expected date: 10/01/2025    7. Obstructive sleep apnea syndrome    -     HOSPITAL BED FOR HOME USE    8. Left hip pain    -     X-Ray Hip 2 or 3 views Left with Pelvis when performed; Future; Expected date: 07/01/2025  -     X-Ray Lumbar Spine AP And  Lateral; Future; Expected date: 07/01/2025    9. Vaping nicotine dependence, non-tobacco product  Counseling on nicotine dependence for 5 minutes.  I am happy that she is no longer smoking cigarettes but I would like her to eventually stop vaping as well    10. Medicare annual wellness visit, subsequent  I offered to discuss advanced care planning,  and how to pick a person who would make decisions for you if you were unable to make them for herself, called a health care power of , and what kind of decisions you might make such as use of life-sustaining treatments such as ventilators and tube feeding when faced with a life-limiting illness recorded on a living will that they will need to know.( How to be cared for as you near the end of your natural life)    The patient has filled out a living will and does not want to be resuscitated in the event of cardiorespiratory arrest.  Her family has a where this     She does not have other Medicare providers at this time    Cancer screening is declined by the patient.  I offered both mammogram and Cologuard but she does not want to do these tests.  Because of her multiple chronic medical problems I believe this is appropriate    11. Frailty  Because of medical frailty she does not require colorectal or breast cancer screening         Follow Up:  Follow up in about 3 months (around 10/1/2025).

## 2025-07-14 DIAGNOSIS — R11.0 NAUSEA: Primary | ICD-10-CM

## 2025-07-14 DIAGNOSIS — K21.9 GASTROESOPHAGEAL REFLUX DISEASE, UNSPECIFIED WHETHER ESOPHAGITIS PRESENT: ICD-10-CM

## 2025-07-14 RX ORDER — PROMETHAZINE HYDROCHLORIDE 25 MG/1
25 TABLET ORAL EVERY 6 HOURS PRN
Qty: 10 TABLET | Refills: 0 | Status: SHIPPED | OUTPATIENT
Start: 2025-07-14

## 2025-07-14 RX ORDER — PANTOPRAZOLE SODIUM 40 MG/1
TABLET, DELAYED RELEASE ORAL
Qty: 90 TABLET | Refills: 1 | Status: SHIPPED | OUTPATIENT
Start: 2025-07-14

## 2025-07-16 DIAGNOSIS — G89.4 CHRONIC PAIN SYNDROME: ICD-10-CM

## 2025-07-16 RX ORDER — TRIPROLIDINE/PSEUDOEPHEDRINE 2.5MG-60MG
TABLET ORAL
Qty: 946 ML | Refills: 1 | Status: SHIPPED | OUTPATIENT
Start: 2025-07-16

## 2025-07-21 DIAGNOSIS — N18.31 TYPE 2 DIABETES MELLITUS WITH STAGE 3A CHRONIC KIDNEY DISEASE, WITHOUT LONG-TERM CURRENT USE OF INSULIN: ICD-10-CM

## 2025-07-21 DIAGNOSIS — K21.9 GASTROESOPHAGEAL REFLUX DISEASE, UNSPECIFIED WHETHER ESOPHAGITIS PRESENT: ICD-10-CM

## 2025-07-21 DIAGNOSIS — G89.4 CHRONIC PAIN SYNDROME: ICD-10-CM

## 2025-07-21 DIAGNOSIS — G60.0 HEREDITARY SENSORIMOTOR NEUROPATHY: ICD-10-CM

## 2025-07-21 DIAGNOSIS — E11.22 TYPE 2 DIABETES MELLITUS WITH STAGE 3A CHRONIC KIDNEY DISEASE, WITHOUT LONG-TERM CURRENT USE OF INSULIN: ICD-10-CM

## 2025-07-21 RX ORDER — FAMOTIDINE 20 MG/1
20 TABLET, FILM COATED ORAL 2 TIMES DAILY
Qty: 180 TABLET | Refills: 1 | Status: SHIPPED | OUTPATIENT
Start: 2025-07-21

## 2025-07-21 RX ORDER — METFORMIN HYDROCHLORIDE 1000 MG/1
1000 TABLET ORAL 2 TIMES DAILY WITH MEALS
Qty: 180 TABLET | Refills: 1 | Status: SHIPPED | OUTPATIENT
Start: 2025-07-21

## 2025-07-21 NOTE — TELEPHONE ENCOUNTER
Famotidine 20mg, QD  Metformin 1000mg, BID   Norco 10mg, Q6H      [facility]        Pt states that she fell over the weekend. She states that she is having severe hip pain and this is what caused her fall. She states that she hit her head and has a big knot. States that HH checked her out & wanted her to go to the ER, but pt refused.

## 2025-07-23 RX ORDER — HYDROCODONE BITARTRATE AND ACETAMINOPHEN 10; 325 MG/1; MG/1
1 TABLET ORAL EVERY 6 HOURS PRN
Qty: 120 TABLET | Refills: 0 | Status: SHIPPED | OUTPATIENT
Start: 2025-07-23 | End: 2025-08-22

## 2025-08-18 DIAGNOSIS — I10 BENIGN ESSENTIAL HYPERTENSION: ICD-10-CM

## 2025-08-18 DIAGNOSIS — F32.9 MAJOR DEPRESSIVE DISORDER WITH CURRENT ACTIVE EPISODE, UNSPECIFIED DEPRESSION EPISODE SEVERITY, UNSPECIFIED WHETHER RECURRENT: ICD-10-CM

## 2025-08-18 DIAGNOSIS — D64.9 ANEMIA, UNSPECIFIED TYPE: Primary | ICD-10-CM

## 2025-08-18 RX ORDER — CLONAZEPAM 1 MG/1
1 TABLET ORAL 2 TIMES DAILY PRN
Qty: 60 TABLET | Refills: 3 | Status: SHIPPED | OUTPATIENT
Start: 2025-08-18

## 2025-08-18 RX ORDER — IRON,CARBONYL/ASCORBIC ACID 100-250 MG
1 TABLET ORAL DAILY
Qty: 90 TABLET | Refills: 3 | Status: SHIPPED | OUTPATIENT
Start: 2025-08-18 | End: 2026-08-18

## 2025-08-18 RX ORDER — LOSARTAN POTASSIUM 100 MG/1
100 TABLET ORAL DAILY
Qty: 90 TABLET | Refills: 3 | Status: SHIPPED | OUTPATIENT
Start: 2025-08-18

## 2025-08-19 DIAGNOSIS — G60.0 HEREDITARY SENSORIMOTOR NEUROPATHY: ICD-10-CM

## 2025-08-19 DIAGNOSIS — G89.4 CHRONIC PAIN SYNDROME: ICD-10-CM

## 2025-08-22 RX ORDER — HYDROCODONE BITARTRATE AND ACETAMINOPHEN 10; 325 MG/1; MG/1
1 TABLET ORAL EVERY 6 HOURS PRN
Qty: 120 TABLET | Refills: 0 | Status: SHIPPED | OUTPATIENT
Start: 2025-08-22 | End: 2025-09-21

## 2025-08-28 ENCOUNTER — TELEPHONE (OUTPATIENT)
Dept: FAMILY MEDICINE | Facility: CLINIC | Age: 60
End: 2025-08-28
Payer: MEDICARE

## 2025-08-28 DIAGNOSIS — E11.40 TYPE 2 DIABETES MELLITUS WITH DIABETIC NEUROPATHY, WITHOUT LONG-TERM CURRENT USE OF INSULIN: ICD-10-CM

## 2025-08-28 RX ORDER — SEMAGLUTIDE 2.68 MG/ML
2 INJECTION, SOLUTION SUBCUTANEOUS
Qty: 3 ML | Refills: 5 | Status: SHIPPED | OUTPATIENT
Start: 2025-08-28 | End: 2026-02-24